# Patient Record
Sex: MALE | Race: WHITE | NOT HISPANIC OR LATINO | Employment: PART TIME | URBAN - METROPOLITAN AREA
[De-identification: names, ages, dates, MRNs, and addresses within clinical notes are randomized per-mention and may not be internally consistent; named-entity substitution may affect disease eponyms.]

---

## 2017-01-25 ENCOUNTER — OFFICE VISIT (OUTPATIENT)
Dept: FAMILY MEDICINE CLINIC | Facility: CLINIC | Age: 67
End: 2017-01-25

## 2017-01-25 VITALS
WEIGHT: 173 LBS | SYSTOLIC BLOOD PRESSURE: 106 MMHG | HEART RATE: 58 BPM | BODY MASS INDEX: 25.62 KG/M2 | DIASTOLIC BLOOD PRESSURE: 68 MMHG | TEMPERATURE: 98 F | RESPIRATION RATE: 16 BRPM | HEIGHT: 69 IN

## 2017-01-25 DIAGNOSIS — L98.9 ARM SKIN LESION, LEFT: Primary | ICD-10-CM

## 2017-01-25 PROCEDURE — 99212 OFFICE O/P EST SF 10 MIN: CPT | Performed by: FAMILY MEDICINE

## 2017-01-25 RX ORDER — MAG HYDROX/ALUMINUM HYD/SIMETH 400-400-40
1 SUSPENSION, ORAL (FINAL DOSE FORM) ORAL
COMMUNITY
End: 2019-02-28

## 2017-01-25 NOTE — PROGRESS NOTES
"Chief Complaint   Patient presents with   • Follow-up     left arm       Subjective   This patient returns the office to recheck left arm.  His previous skin lesion is totally resolved.  Review of Systems   Skin:        See hpi       Objective   Visit Vitals   • /68   • Pulse 58   • Temp 98 °F (36.7 °C) (Oral)   • Resp 16   • Ht 69\" (175.3 cm)   • Wt 173 lb (78.5 kg)   • BMI 25.55 kg/m2     Body mass index is 25.55 kg/(m^2).  Physical Exam   Skin:   Please see photo taken during office visit today that is seen below and also is filed under the media tab in the chart review section of patient's chart.           Assessment/Plan     Problem List Items Addressed This Visit        Musculoskeletal and Integument    RESOLVED: Arm skin lesion, left - Primary          Outpatient Encounter Prescriptions as of 1/25/2017   Medication Sig Dispense Refill   • aspirin 81 MG tablet Take 81 mg by mouth daily.     • GLUCOS-CHONDROIT-HYALURON-D3 PO Take 1 tablet by mouth.     • Omega-3 Fatty Acids (FISH OIL EXTRA STRENGTH) 1200 MG capsule Take 1 capsule by mouth.     • Saw Palmetto 450 MG capsule Take 1 capsule by mouth.       No facility-administered encounter medications on file as of 1/25/2017.        No orders of the defined types were placed in this encounter.      Continue with current treatment plan.         RTC as needed or sooner if symptoms worsen or change  "

## 2017-01-25 NOTE — MR AVS SNAPSHOT
"                        Jama Birch   1/25/2017 11:15 AM   Office Visit    Provider:  Jama Moses MD   Department:  [facility]   Dept Phone:  [phone]                Your Full Care Plan              Your Updated Medication List          This list is accurate as of: 1/25/17 11:52 AM.  Always use your most recent med list.                aspirin 81 MG tablet       FISH OIL EXTRA STRENGTH 1200 MG capsule       GLUCOS-CHONDROIT-HYALURON-D3 PO       Saw Palmetto 450 MG capsule               You Were Diagnosed With        Codes Comments    Arm skin lesion, left    -  Primary ICD-10-CM: L98.9  ICD-9-CM: 709.9       Instructions     None    Patient Instructions History      MyChart Signup     Our records indicate that you have an active [facility] [employer] account.    You can view your After Visit Summary by going to [employer] and logging in with your [employer] username and password.  If you don't have a [employer] username and password but a parent or guardian has access to your record, the parent or guardian should login with their own [employer] username and password and access your record to view the After Visit Summary.    If you have questions, you can email [email or web address] or call [phone] to talk to our [employer] staff.  Remember, [employer] is NOT to be used for urgent needs.  For medical emergencies, dial 911.               Other Info from Your Visit           Allergies     No Known Allergies      Reason for Visit     Follow-up left arm      Vital Signs     Blood Pressure Pulse Temperature Respirations Height Weight    106/68 58 98 °F (36.7 °C) (Oral) 16 69\" (175.3 cm) 173 lb (78.5 kg)    Body Mass Index Smoking Status                25.55 kg/m2 Never Smoker          No Longer an Issue     Arm skin lesion, left      "

## 2017-10-25 ENCOUNTER — OFFICE VISIT (OUTPATIENT)
Dept: FAMILY MEDICINE CLINIC | Facility: CLINIC | Age: 67
End: 2017-10-25

## 2017-10-25 VITALS
HEIGHT: 69 IN | TEMPERATURE: 98.1 F | WEIGHT: 176 LBS | SYSTOLIC BLOOD PRESSURE: 118 MMHG | DIASTOLIC BLOOD PRESSURE: 69 MMHG | RESPIRATION RATE: 16 BRPM | BODY MASS INDEX: 26.07 KG/M2 | HEART RATE: 58 BPM

## 2017-10-25 DIAGNOSIS — R35.1 NOCTURIA: ICD-10-CM

## 2017-10-25 DIAGNOSIS — Z00.00 ANNUAL PHYSICAL EXAM: Primary | ICD-10-CM

## 2017-10-25 DIAGNOSIS — Z11.59 NEED FOR HEPATITIS C SCREENING TEST: ICD-10-CM

## 2017-10-25 DIAGNOSIS — R93.89 ABNORMAL CT SCAN: ICD-10-CM

## 2017-10-25 DIAGNOSIS — D17.1 LIPOMA OF TORSO: ICD-10-CM

## 2017-10-25 PROCEDURE — 99397 PER PM REEVAL EST PAT 65+ YR: CPT | Performed by: FAMILY MEDICINE

## 2017-10-25 NOTE — PROGRESS NOTES
"Chief Complaint   Patient presents with   • Annual Exam       Subjective   This patient presents the office for well visit.  Overall he feels healthy.  Labs from urgent care center in March have been reviewed and are within normal limits.  He would like to get screening for hepatitis C.  He will send us information on his immunization status through his pharmacy.  We took several minutes and updated his medical records.  I have reviewed and updated his medications, medical history and problem list during today's office visit.        Social History   Substance Use Topics   • Smoking status: Never Smoker   • Smokeless tobacco: Never Used   • Alcohol use Yes      Comment: 3/month       Review of Systems   Constitutional: Negative for fatigue and unexpected weight change.   HENT: Negative.    Eyes: Negative.    Respiratory: Negative.    Cardiovascular: Negative.  Negative for chest pain.   Gastrointestinal: Negative.    Endocrine: Negative.    Genitourinary: Negative for difficulty urinating.   Musculoskeletal: Positive for arthralgias (stiffness).   Skin: Negative.    Allergic/Immunologic: Negative.    Neurological:        Vertigo 6 months ago   Hematological: Negative.    Psychiatric/Behavioral: Negative.        Objective   /69  Pulse 58  Temp 98.1 °F (36.7 °C) (Oral)   Resp 16  Ht 69\" (175.3 cm)  Wt 176 lb (79.8 kg)  BMI 25.99 kg/m2  Body mass index is 25.99 kg/(m^2).  Physical Exam   Constitutional: He is oriented to person, place, and time. Vital signs are normal. He appears well-developed and well-nourished. He is cooperative. No distress.   HENT:   Head: Normocephalic.   Right Ear: Hearing, tympanic membrane and external ear normal.   Left Ear: Hearing, tympanic membrane and external ear normal.   Nose: Nose normal.   Mouth/Throat: Uvula is midline, oropharynx is clear and moist and mucous membranes are normal.   Eyes: Conjunctivae, EOM and lids are normal. Pupils are equal, round, and reactive to " light.   Fundoscopic exam:       The right eye shows no AV nicking and no papilledema.        The left eye shows no AV nicking and no papilledema.   Neck: Trachea normal and normal range of motion. Neck supple. No JVD present. Carotid bruit is not present. No tracheal deviation present. No thyroid mass and no thyromegaly present.   Cardiovascular: Normal rate, regular rhythm, normal heart sounds and normal pulses.    No murmur heard.  Pulmonary/Chest: Effort normal and breath sounds normal.   Abdominal: Soft. Normal appearance and bowel sounds are normal. There is no hepatosplenomegaly. There is no tenderness.   Musculoskeletal: Normal range of motion.        Lumbar back: He exhibits no bony tenderness.   Lymphadenopathy:     He has no cervical adenopathy.        Right: No supraclavicular adenopathy present.        Left: No supraclavicular adenopathy present.   Neurological: He is alert and oriented to person, place, and time. He has normal strength. He is not disoriented. No cranial nerve deficit.   Reflex Scores:       Patellar reflexes are 2+ on the right side and 2+ on the left side.       Achilles reflexes are 2+ on the right side and 2+ on the left side.  Skin: Skin is warm and dry. No rash noted. No cyanosis. Nails show no clubbing.   Psychiatric: He has a normal mood and affect. His speech is normal and behavior is normal. Judgment and thought content normal. Cognition and memory are normal.   Vitals reviewed.      Mid back fatty lesion    Data Reviewed:        Assessment/Plan     Problem List Items Addressed This Visit        Genitourinary    Nocturia     Check PSA         Relevant Orders    PSA       Other    Lipoma of torso     Monitor yearly         Abnormal CT scan; splenic flexure thickening     He will check with GI to see if he needs additional CT follow up and then let me know           Other Visit Diagnoses     Annual physical exam    -  Primary    Need for hepatitis C screening test         Relevant Orders    Hepatitis C Antibody          Outpatient Encounter Prescriptions as of 10/25/2017   Medication Sig Dispense Refill   • aspirin 81 MG tablet Take 81 mg by mouth daily.     • Omega-3 Fatty Acids (FISH OIL EXTRA STRENGTH) 1200 MG capsule Take 1 capsule by mouth.     • Saw Palmetto 450 MG capsule Take 1 capsule by mouth.     • [DISCONTINUED] GLUCOS-CHONDROIT-HYALURON-D3 PO Take 1 tablet by mouth.       No facility-administered encounter medications on file as of 10/25/2017.        Orders Placed This Encounter   Procedures   • Hepatitis C Antibody   • PSA              F/U in one year

## 2017-10-26 LAB
HCV AB S/CO SERPL IA: <0.1 S/CO RATIO (ref 0–0.9)
PSA SERPL-MCNC: 0.63 NG/ML (ref 0–4)

## 2017-10-26 NOTE — PROGRESS NOTES
I have reviewed your recent test results and have found them to be within normal limits. Your prostate test is normal. There is no evidence of prior hepatis C infection.  Please continue with current recommendations as discussed at our last visit. Please keep follow up schedule with me as set up.  Please call if you have any questions                                                             Dr. Moses

## 2017-11-06 ENCOUNTER — APPOINTMENT (OUTPATIENT)
Dept: CARDIOLOGY | Facility: HOSPITAL | Age: 67
End: 2017-11-06

## 2017-11-06 ENCOUNTER — HOSPITAL ENCOUNTER (EMERGENCY)
Facility: HOSPITAL | Age: 67
Discharge: HOME | End: 2017-11-06
Attending: EMERGENCY MEDICINE | Admitting: EMERGENCY MEDICINE

## 2017-11-06 ENCOUNTER — APPOINTMENT (OUTPATIENT)
Dept: GENERAL RADIOLOGY | Facility: HOSPITAL | Age: 67
End: 2017-11-06

## 2017-11-06 VITALS
RESPIRATION RATE: 15 BRPM | WEIGHT: 175 LBS | OXYGEN SATURATION: 96 % | SYSTOLIC BLOOD PRESSURE: 147 MMHG | TEMPERATURE: 98.5 F | DIASTOLIC BLOOD PRESSURE: 77 MMHG | HEART RATE: 74 BPM | BODY MASS INDEX: 25.92 KG/M2 | HEIGHT: 69 IN

## 2017-11-06 DIAGNOSIS — M25.571 ACUTE RIGHT ANKLE PAIN: Primary | ICD-10-CM

## 2017-11-06 LAB
ALBUMIN SERPL-MCNC: 4.5 G/DL (ref 3.5–5.2)
ALBUMIN/GLOB SERPL: 1.5 G/DL
ALP SERPL-CCNC: 64 U/L (ref 39–117)
ALT SERPL W P-5'-P-CCNC: 20 U/L (ref 1–41)
ANION GAP SERPL CALCULATED.3IONS-SCNC: 11.7 MMOL/L
AST SERPL-CCNC: 26 U/L (ref 1–40)
BASOPHILS # BLD AUTO: 0.02 10*3/MM3 (ref 0–0.2)
BASOPHILS NFR BLD AUTO: 0.3 % (ref 0–1.5)
BILIRUB SERPL-MCNC: 0.7 MG/DL (ref 0.1–1.2)
BUN BLD-MCNC: 15 MG/DL (ref 8–23)
BUN/CREAT SERPL: 17 (ref 7–25)
CALCIUM SPEC-SCNC: 9.3 MG/DL (ref 8.6–10.5)
CHLORIDE SERPL-SCNC: 100 MMOL/L (ref 98–107)
CO2 SERPL-SCNC: 28.3 MMOL/L (ref 22–29)
CREAT BLD-MCNC: 0.88 MG/DL (ref 0.76–1.27)
DEPRECATED RDW RBC AUTO: 41.8 FL (ref 37–54)
EOSINOPHIL # BLD AUTO: 0.04 10*3/MM3 (ref 0–0.7)
EOSINOPHIL NFR BLD AUTO: 0.5 % (ref 0.3–6.2)
ERYTHROCYTE [DISTWIDTH] IN BLOOD BY AUTOMATED COUNT: 12.4 % (ref 11.5–14.5)
GFR SERPL CREATININE-BSD FRML MDRD: 87 ML/MIN/1.73
GLOBULIN UR ELPH-MCNC: 3.1 GM/DL
GLUCOSE BLD-MCNC: 83 MG/DL (ref 65–99)
HCT VFR BLD AUTO: 40.4 % (ref 40.4–52.2)
HGB BLD-MCNC: 14.1 G/DL (ref 13.7–17.6)
HOLD SPECIMEN: NORMAL
HOLD SPECIMEN: NORMAL
IMM GRANULOCYTES # BLD: 0.02 10*3/MM3 (ref 0–0.03)
IMM GRANULOCYTES NFR BLD: 0.3 % (ref 0–0.5)
LYMPHOCYTES # BLD AUTO: 1.76 10*3/MM3 (ref 0.9–4.8)
LYMPHOCYTES NFR BLD AUTO: 23 % (ref 19.6–45.3)
MCH RBC QN AUTO: 32.5 PG (ref 27–32.7)
MCHC RBC AUTO-ENTMCNC: 34.9 G/DL (ref 32.6–36.4)
MCV RBC AUTO: 93.1 FL (ref 79.8–96.2)
MONOCYTES # BLD AUTO: 0.89 10*3/MM3 (ref 0.2–1.2)
MONOCYTES NFR BLD AUTO: 11.6 % (ref 5–12)
NEUTROPHILS # BLD AUTO: 4.91 10*3/MM3 (ref 1.9–8.1)
NEUTROPHILS NFR BLD AUTO: 64.3 % (ref 42.7–76)
PLATELET # BLD AUTO: 185 10*3/MM3 (ref 140–500)
PMV BLD AUTO: 8.6 FL (ref 6–12)
POTASSIUM BLD-SCNC: 4.3 MMOL/L (ref 3.5–5.2)
PROT SERPL-MCNC: 7.6 G/DL (ref 6–8.5)
RBC # BLD AUTO: 4.34 10*6/MM3 (ref 4.6–6)
SODIUM BLD-SCNC: 140 MMOL/L (ref 136–145)
WBC NRBC COR # BLD: 7.64 10*3/MM3 (ref 4.5–10.7)
WHOLE BLOOD HOLD SPECIMEN: NORMAL
WHOLE BLOOD HOLD SPECIMEN: NORMAL

## 2017-11-06 PROCEDURE — 85025 COMPLETE CBC W/AUTO DIFF WBC: CPT | Performed by: EMERGENCY MEDICINE

## 2017-11-06 PROCEDURE — 80053 COMPREHEN METABOLIC PANEL: CPT | Performed by: EMERGENCY MEDICINE

## 2017-11-06 PROCEDURE — 73610 X-RAY EXAM OF ANKLE: CPT

## 2017-11-06 PROCEDURE — 93971 EXTREMITY STUDY: CPT

## 2017-11-06 PROCEDURE — 99283 EMERGENCY DEPT VISIT LOW MDM: CPT

## 2017-11-06 PROCEDURE — 36415 COLL VENOUS BLD VENIPUNCTURE: CPT | Performed by: EMERGENCY MEDICINE

## 2017-11-06 RX ORDER — HYDROCODONE BITARTRATE AND ACETAMINOPHEN 5; 325 MG/1; MG/1
1 TABLET ORAL EVERY 6 HOURS PRN
Qty: 20 TABLET | Refills: 0 | Status: SHIPPED | OUTPATIENT
Start: 2017-11-06 | End: 2019-02-28

## 2017-11-07 LAB
BH CV LOWER VASCULAR LEFT COMMON FEMORAL AUGMENT: NORMAL
BH CV LOWER VASCULAR LEFT COMMON FEMORAL COMPETENT: NORMAL
BH CV LOWER VASCULAR LEFT COMMON FEMORAL COMPRESS: NORMAL
BH CV LOWER VASCULAR LEFT COMMON FEMORAL PHASIC: NORMAL
BH CV LOWER VASCULAR LEFT COMMON FEMORAL SPONT: NORMAL
BH CV LOWER VASCULAR RIGHT COMMON FEMORAL AUGMENT: NORMAL
BH CV LOWER VASCULAR RIGHT COMMON FEMORAL COMPETENT: NORMAL
BH CV LOWER VASCULAR RIGHT COMMON FEMORAL COMPRESS: NORMAL
BH CV LOWER VASCULAR RIGHT COMMON FEMORAL PHASIC: NORMAL
BH CV LOWER VASCULAR RIGHT COMMON FEMORAL SPONT: NORMAL
BH CV LOWER VASCULAR RIGHT DISTAL FEMORAL COMPRESS: NORMAL
BH CV LOWER VASCULAR RIGHT GASTRONEMIUS COMPRESS: NORMAL
BH CV LOWER VASCULAR RIGHT GREATER SAPH AK COMPRESS: NORMAL
BH CV LOWER VASCULAR RIGHT GREATER SAPH BK COMPRESS: NORMAL
BH CV LOWER VASCULAR RIGHT LESSER SAPH COMPRESS: NORMAL
BH CV LOWER VASCULAR RIGHT MID FEMORAL AUGMENT: NORMAL
BH CV LOWER VASCULAR RIGHT MID FEMORAL COMPETENT: NORMAL
BH CV LOWER VASCULAR RIGHT MID FEMORAL COMPRESS: NORMAL
BH CV LOWER VASCULAR RIGHT MID FEMORAL PHASIC: NORMAL
BH CV LOWER VASCULAR RIGHT MID FEMORAL SPONT: NORMAL
BH CV LOWER VASCULAR RIGHT PERONEAL COMPRESS: NORMAL
BH CV LOWER VASCULAR RIGHT POPLITEAL AUGMENT: NORMAL
BH CV LOWER VASCULAR RIGHT POPLITEAL COMPETENT: NORMAL
BH CV LOWER VASCULAR RIGHT POPLITEAL COMPRESS: NORMAL
BH CV LOWER VASCULAR RIGHT POPLITEAL PHASIC: NORMAL
BH CV LOWER VASCULAR RIGHT POPLITEAL SPONT: NORMAL
BH CV LOWER VASCULAR RIGHT POSTERIOR TIBIAL COMPRESS: NORMAL
BH CV LOWER VASCULAR RIGHT PROXIMAL FEMORAL COMPRESS: NORMAL
BH CV LOWER VASCULAR RIGHT SAPHENOFEMORAL JUNCTION AUGMENT: NORMAL
BH CV LOWER VASCULAR RIGHT SAPHENOFEMORAL JUNCTION COMPETENT: NORMAL
BH CV LOWER VASCULAR RIGHT SAPHENOFEMORAL JUNCTION COMPRESS: NORMAL
BH CV LOWER VASCULAR RIGHT SAPHENOFEMORAL JUNCTION PHASIC: NORMAL
BH CV LOWER VASCULAR RIGHT SAPHENOFEMORAL JUNCTION SPONT: NORMAL

## 2017-11-07 NOTE — ED NOTES
Pt w increased right ankle swelling that started today without injury. Reports similar symptoms when twisted ankle 2 months ago.    Skin pwd, no extra warmth noted. CMS intact. Pedal pulses intact. Able to wt bear w only slight limp.    Pt here to r/o DVT to ankle.        Agatha Michelle RN  11/06/17 2019

## 2017-11-07 NOTE — PROGRESS NOTES
Vascular lab right lower extremity venous doppler complete, prelim negative DVT right leg - Dr Sam aware

## 2017-11-07 NOTE — ED PROVIDER NOTES
EMERGENCY DEPARTMENT ENCOUNTER    CHIEF COMPLAINT  Chief Complaint: right ankle swelling  History given by: patient  History limited by: nothing   Room Number: Room/bed info not found  PMD: Jama Moses MD      HPI:  Pt is a 67 y.o. male who presents complaining of atraumatic right ankle pain and swelling onset this morning. Pt reports that he had similar symptoms after twisting his ankle several months ago, but he has not injured it since then.     Onset: this morning  Timing: constant   Location: right ankle   Radiation: does not radiate   Quality: atraumatic  Intensity/Severity: moderate   Progression: unchanged   Associated Symptoms: pain and swelling   Aggravating Factors: none specified   Alleviating Factors: none specified   Previous Episodes: Pt had similar sx a few months ago after a sprain but has not injured it or had symptoms since   Treatment before arrival: none specified     PAST MEDICAL HISTORY  Active Ambulatory Problems     Diagnosis Date Noted   • Abnormal CT scan; splenic flexure thickening 2016   • Lipoma of torso 10/25/2017   • Nocturia 10/25/2017     Resolved Ambulatory Problems     Diagnosis Date Noted   • Arm skin lesion, left 2016   • Dizziness 2016     Past Medical History:   Diagnosis Date   • Kidney stone    • Prostate disorder        PAST SURGICAL HISTORY  History reviewed. No pertinent surgical history.    FAMILY HISTORY  Family History   Problem Relation Age of Onset   • Other Mother      brain tumor, meningioma   • Lung cancer Father 48     heavy smoker   • Diabetes Maternal Aunt    • Diabetes Maternal Uncle    • Hypertension Maternal Uncle    • Heart disease Paternal Grandfather    • Heart attack Maternal Grandmother    • Heart attack Paternal Grandmother 68        • Parkinsonism Maternal Uncle        SOCIAL HISTORY  Social History     Social History   • Marital status:      Spouse name: Tracey   • Number of children: 3   • Years of education: N/A      Occupational History   • [employer]           Social History Main Topics   • Smoking status: Never Smoker   • Smokeless tobacco: Never Used   • Alcohol use Yes      Comment: 3/month   • Drug use: No   • Sexual activity: Not on file     Other Topics Concern   • Not on file     Social History Narrative    Anniversary 1976     82 Coby, 85 Ernesto, 9/3/86 Lizbeth    5 Grandchildren       ALLERGIES  Review of patient's allergies indicates no known allergies.    REVIEW OF SYSTEMS  Review of Systems   Constitutional: Negative for fever.   Respiratory: Negative for shortness of breath.    Cardiovascular: Negative for chest pain.   Musculoskeletal:        Right ankle pain and swelling        PHYSICAL EXAM  ED Triage Vitals   Temp Heart Rate Resp BP SpO2   17   98.5 °F (36.9 °C) 87 16 142/85 97 %      Temp src Heart Rate Source Patient Position BP Location FiO2 (%)   -- -- -- -- --              Physical Exam   Constitutional: No distress.   HENT:   Head: Normocephalic and atraumatic.   Eyes: EOM are normal.   Neck: Normal range of motion.   Cardiovascular: Normal heart sounds.    Pulmonary/Chest: No respiratory distress.   Abdominal: There is no tenderness.   Musculoskeletal: He exhibits no edema.        Right ankle: He exhibits swelling. Tenderness. AITFL tenderness found. No head of 5th metatarsal tenderness found.   Neurological: He is alert.   Skin: Skin is warm and dry.   Nursing note and vitals reviewed.      LAB RESULTS  Lab Results (last 24 hours)     Procedure Component Value Units Date/Time    CBC & Differential [[id number]] Collected:  17    Specimen:  Blood Updated:  17    Narrative:       The following orders were created for panel order CBC & Differential.  Procedure                               Abnormality         Status                     ---------                                -----------         ------                     CBC Auto Differential[[id number]]        Abnormal            Final result                 Please view results for these tests on the individual orders.    Comprehensive Metabolic Panel [[id number]] Collected:  11/06/17 2016    Specimen:  Blood Updated:  11/06/17 2045     Glucose 83 mg/dL      BUN 15 mg/dL      Creatinine 0.88 mg/dL      Sodium 140 mmol/L      Potassium 4.3 mmol/L      Chloride 100 mmol/L      CO2 28.3 mmol/L      Calcium 9.3 mg/dL      Total Protein 7.6 g/dL      Albumin 4.50 g/dL      ALT (SGPT) 20 U/L      AST (SGOT) 26 U/L      Alkaline Phosphatase 64 U/L      Total Bilirubin 0.7 mg/dL      eGFR Non African Amer 87 mL/min/1.73      Globulin 3.1 gm/dL      A/G Ratio 1.5 g/dL      BUN/Creatinine Ratio 17.0     Anion Gap 11.7 mmol/L     CBC Auto Differential [[id number]]  (Abnormal) Collected:  11/06/17 2016    Specimen:  Blood Updated:  11/06/17 2025     WBC 7.64 10*3/mm3      RBC 4.34 (L) 10*6/mm3      Hemoglobin 14.1 g/dL      Hematocrit 40.4 %      MCV 93.1 fL      MCH 32.5 pg      MCHC 34.9 g/dL      RDW 12.4 %      RDW-SD 41.8 fl      MPV 8.6 fL      Platelets 185 10*3/mm3      Neutrophil % 64.3 %      Lymphocyte % 23.0 %      Monocyte % 11.6 %      Eosinophil % 0.5 %      Basophil % 0.3 %      Immature Grans % 0.3 %      Neutrophils, Absolute 4.91 10*3/mm3      Lymphocytes, Absolute 1.76 10*3/mm3      Monocytes, Absolute 0.89 10*3/mm3      Eosinophils, Absolute 0.04 10*3/mm3      Basophils, Absolute 0.02 10*3/mm3      Immature Grans, Absolute 0.02 10*3/mm3           I ordered the above labs and reviewed the results    RADIOLOGY  XR Ankle 3+ View Right   Preliminary Result   No acute fracture or dislocation. Moderately large ankle joint effusion,   indeterminate etiology and significance. Soft tissue swelling around the   ankle. Given no history of trauma, an infectious process/septic   arthritis cannot be excluded.            XR right ankle is  negative.     Preliminary doppler report: negative for DVT    I ordered the above noted radiological studies. Interpreted by radiologist. Reviewed by me in PACS.       PROCEDURES  Procedures  Splint Application:   Location: right ankle  Splint Type: walking boot  The splinted body part was neurovascularly unchanged and is in good alignment following the procedure.  Patient tolerated the procedure well with no immediate complications.      PROGRESS AND CONSULTS  ED Course     2010: Venous doppler RLE and labs were ordered in triage.     2149: Discussed negative US and plan to order XR to rule out pathologic lesion.     2225: XR was negative. Pt will be discharged with walking boot since pt seems to have sustained a ligamentous injury. I encouraged him to elevate/ice the area and f/u with orthopedist as needed. All questions were addressed    MEDICAL DECISION MAKING  Results were reviewed/discussed with the patient and they were also made aware of online access. Pt also made aware that some labs, such as cultures, will not be resulted during ER visit and follow up with PMD is necessary.     MDM  Number of Diagnoses or Management Options     Amount and/or Complexity of Data Reviewed  Clinical lab tests: reviewed  Tests in the radiology section of CPT®: ordered and reviewed    Patient Progress  Patient progress: stable         DIAGNOSIS  Final diagnoses:   Acute right ankle pain       DISPOSITION  DISCHARGE    Patient discharged in stable condition.    Reviewed implications of results, diagnosis, meds, responsibility to follow up, warning signs and symptoms of possible worsening, potential complications and reasons to return to ER.     Patient/Family voiced understanding of above instructions.    Discussed plan for discharge, as there is no emergent indication for admission.  Pt/family is agreeable and understands need for follow up and repeat testing.  Pt is aware that discharge does not mean that nothing is wrong but  it indicates no emergency is present that requires admission and they must continue care with follow-up as given below or physician of their choice.     FOLLOW-UP  [facility] OCCUPATIONAL [facility]ICINE [facility]   [address] [address]   [facility] [address] [phone]  Schedule an appointment as soon as possible for a visit           Medication List      New Prescriptions          HYDROcodone-acetaminophen 5-325 MG per tablet   Commonly known as:  NORCO   Take 1 tablet by mouth Every 6 (Six) Hours As Needed for Moderate Pain .           Latest Documented Vital Signs:  As of 10:26 PM  BP- 142/85 HR- 84 Temp- 98.5 °F (36.9 °C) O2 sat- 98%    --  Documentation assistance provided by genet Hoover for Jm Sam.  Information recorded by the scribe was done at my direction and has been verified and validated by me.           Subha Hoover  11/06/17 2226       Jm Sam MD  11/07/17 0027

## 2017-11-08 ENCOUNTER — TELEPHONE (OUTPATIENT)
Dept: SOCIAL WORK | Facility: HOSPITAL | Age: 67
End: 2017-11-08

## 2017-11-08 NOTE — TELEPHONE ENCOUNTER
ED f/u phone call: spoke w/ wife, as patient is at work. States that he is following d/c instructions and taking Ibuprofen for pain w/ relief. Reminded to make f/u katie't. No questions/concerns

## 2018-02-13 ENCOUNTER — TRANSCRIBE ORDERS (OUTPATIENT)
Dept: PHYSICAL THERAPY | Facility: CLINIC | Age: 68
End: 2018-02-13

## 2018-02-13 DIAGNOSIS — M72.2 PLANTAR FASCIITIS OF LEFT FOOT: Primary | ICD-10-CM

## 2018-02-20 ENCOUNTER — TREATMENT (OUTPATIENT)
Dept: PHYSICAL THERAPY | Facility: CLINIC | Age: 68
End: 2018-02-20

## 2018-02-20 DIAGNOSIS — M79.672 PAIN IN LEFT FOOT: Primary | ICD-10-CM

## 2018-02-20 PROCEDURE — 97110 THERAPEUTIC EXERCISES: CPT | Performed by: PHYSICAL THERAPIST

## 2018-02-20 PROCEDURE — 97035 APP MDLTY 1+ULTRASOUND EA 15: CPT | Performed by: PHYSICAL THERAPIST

## 2018-02-20 PROCEDURE — 97161 PT EVAL LOW COMPLEX 20 MIN: CPT | Performed by: PHYSICAL THERAPIST

## 2018-02-20 NOTE — PROGRESS NOTES
Physical Therapy Initial Evaluation and Plan of Care    Patient: Jama Birch   : 1950  Diagnosis/ICD-10 Code:  Pain in left foot [M79.672]  Referring practitioner: Dianna Concepcion MD  Past medical Hx reviewed: 2018     Subjective Evaluation    History of Present Illness  Date of onset: 2018  Mechanism of injury: His heel has felt like it had a bruise then 3 weeks ago he had shooting pain up his calf. He went to his doctor who gave him meloxicam.  The pain gets worse when on his feet for long times.  He has been rolling a lacrosse ball over his foot and using a night splint. He does't report any trouble sleeping. Before the night splint he had worse pain in the morning and would have to walk around on it to make it feel better.       Patient Occupation: employee at [employer]. Prolonged WB'ing required.     Precautions and Work Restrictions: noneQuality of life: good    Pain  Current pain ratin  At best pain ratin  At worst pain ratin  Quality: burning (nagging pain, burning in the back of his heel)  Aggravating factors: standing  Progression: improved    Social Support  Lives in: one-story house  Lives with: spouse    Hand dominance: right    Treatments  Treatments tried: using a foot spa with epson salt and warm water for about 30 minutes   Current treatment: medication  Current treatment comments: night splint.     Patient Goals  Patient goals for therapy: decreased pain and increased motion  Patient goal: wants to get rid of his pain so he doesn't have to walk with a limp.     Objective       Static Posture     Comments  Pts calcaneous slightly inverted bilaterally with limited motion.     Tenderness   Left Ankle/Foot   Tenderness in the plantar fascia.     Passive Range of Motion   Left Hip   Flexion: 128 degrees     Right Hip   Flexion: 125 degrees   Left Knee   Extension: 154 (Hamstring (90/90)) degrees     Right Knee   Extension: 145 (Hamstring (90/90)) degrees   Left  Ankle/Foot    Dorsiflexion (kf): 10 degrees   Plantar flexion: 45 degrees     Right Ankle/Foot    Dorsiflexion (kf): 20 degrees    Plantar flexion: 60 degrees     Strength/Myotome Testing     Left Hip   Planes of Motion   Flexion: 5  Extension: 5  Abduction: 5  Adduction: 5  External rotation: 5  Internal rotation: 5    Right Hip   Planes of Motion   Flexion: 5  Extension: 5  Abduction: 5  Adduction: 5  External rotation: 5  Internal rotation: 3+    Left Knee   Flexion: 5  Extension: 5    Right Knee   Flexion: 5  Extension: 5    Left Ankle/Foot   Dorsiflexion: 5  Plantar flexion: 5  Inversion: 5  Eversion: 5  Great toe flexion: 5  Great toe extension: 5    Right Ankle/Foot   Dorsiflexion: 5  Plantar flexion: 5  Inversion: 5  Eversion: 5  Great toe flexion: 5  Great toe extension: 5    Ambulation     Comments   When ambulating he presents with an antalgic gait pattern.  He has a decreased stance time on his left leg due to the pain in his left foot when ambulating.  He has increased pain when ambulating barefoot verses when ambulating with his shoes on his feet.       Assessment & Plan     Assessment  Impairments: abnormal gait, abnormal or restricted ROM, impaired physical strength and pain with function  Assessment details: Pt presents with ROM limitations especially with DF and PF in his LLE. He has tenderness over his  planter fascia in his L foot which has been causing him to walk with an antalgic gait pattern. He has difficulty and pain when performing functional ADLs and recreational activities.  Pt will benefit from skilled physical therapy services to address is limited ROM, pain and gait pattern.   Prognosis: good  Prognosis details:     Functional Limitations: walking, uncomfortable because of pain and standing  Goals  Plan Goals:   Short term goals:  4 weeks  1.Pt to be instructed in initial HEP.  2. Minimal palpable tenderness to his left planter fascia to improve his gait pattern and decrease his pain.    3. Pt will increase his ROM (Hamstring 90/90 bilaterally to 160 degrees) to improve his ability to perform functional ADLs    Long term goals:  8 weeks  1. Pt will report < 3/10 at worst to complete functional activities.   2. Pt will increase his ROM (DF to 15, PF to 45 ) to normalize his gait pattern and improve his ability to perform functional ADLs  3. Pt will report <2/10 pain after completion of one work day to improve his ability to perform functional ADLs.   4. Pt will score an LEFS > 73/80  (% perceived normal ability)  5. Pt will report no palapable tenderness to his left planter fascia to improve gait and functional ADLs  6. Pt will ambulate for 2 minutes with an obeserved normal gait pattern (stance phase time similar bilaterally) to improve gait pattern.     Plan  Therapy options: will be seen for skilled physical therapy services  Planned modality interventions: ultrasound, TENS, cryotherapy and iontophoresis  Other planned modality interventions: Dry needling PRN   Planned therapy interventions: joint mobilization, home exercise program, gait training, flexibility, balance/weight-bearing training, manual therapy, neuromuscular re-education, soft tissue mobilization, strengthening, stretching and therapeutic activities  Frequency: 2x week  Duration in weeks: 8  Treatment plan discussed with: patient  Plan details: Pt will benefit from skilled physical therapy including education and implementation of an HEP, strengthening to his hips and improving ROM especially in his left ankle.  He will also benefit from manual therapy and modalities such as ultrasound.         Manual Therapy:    4     mins  50033; (Heel taping)   Therapeutic Exercise:    12     mins  02878;     Neuromuscular Percy:    -    mins  34065;    Therapeutic Activity:     -     mins  88245;     Gait Training:      -     mins  85888;     Ultrasound:     8     mins  46011;    Electrical Stimulation:    -     mins  07512 ( );  Dry  Needling     -     mins self-pay    Timed Treatment:   24   mins   Total Treatment:     60   mins    I was present in the PT Department guiding the student by approving, concurring, and confirming the skilled judgement for all services rendered.     PT SIGNATURE: ABDULLAHI Carrasco License #: [id number]    DATE TREATMENT INITIATED: 2/21/2018    Initial Certification  Certification Period: 5/22/2018  I certify that the therapy services are furnished while this patient is under my care.  The services outlined above are required by this patient, and will be reviewed every 90 days.     PHYSICIAN: Dianna Concepcion MD      DATE:     Please sign and return via fax to [phone].. Thank you, [facility].

## 2018-02-26 ENCOUNTER — TREATMENT (OUTPATIENT)
Dept: PHYSICAL THERAPY | Facility: CLINIC | Age: 68
End: 2018-02-26

## 2018-02-26 DIAGNOSIS — M79.672 PAIN IN LEFT FOOT: Primary | ICD-10-CM

## 2018-02-26 PROCEDURE — 97035 APP MDLTY 1+ULTRASOUND EA 15: CPT | Performed by: PHYSICAL THERAPIST

## 2018-02-26 PROCEDURE — 97110 THERAPEUTIC EXERCISES: CPT | Performed by: PHYSICAL THERAPIST

## 2018-02-26 NOTE — PROGRESS NOTES
Physical Therapy Daily Progress Note  Visits:2    Subjective : Jama Birch reports: his foot is feeling better.  He states he was on vacation last week so he wasn't on his feet as much as he would be during a normal work week.  His night splints were causing some numbness so he loosened them. He reports the heel taping felt great and just fell off yesterday.     Objective   Gait Observation: Decreased limping noted during ambulation.     See Exercise, Manual, and Modality Logs for complete treatment.     Assessment/Plan: A great toe stretch and toe intrinsics with a towel were added to his HEP.  He had difficulty completing the toe intrinsic exercise with the marbles but was properly activating his muscles and will continue to progress during following treatment sessions.        Progress per Plan of Care         Manual Therapy:    5     mins  38700; Leukotaping to L heel   Therapeutic Exercise:    32     mins  50493;     Neuromuscular Percy:    -    mins  61172;    Therapeutic Activity:     -     mins  18837;     Gait Training:      -     mins  19406;     Ultrasound:    8   mins  58551;    Electrical Stimulation:    -     mins  20764 ( );  Dry Needling     -     mins self-pay    Timed Treatment:   45   mins   Total Treatment:     60   mins    I was present in the PT Department guiding the student by approving, concurring, and confirming the skilled judgement for all services rendered.     David Rowe PT  [place] License #[id number]    Physical Therapist

## 2018-03-02 ENCOUNTER — TREATMENT (OUTPATIENT)
Dept: PHYSICAL THERAPY | Facility: CLINIC | Age: 68
End: 2018-03-02

## 2018-03-02 DIAGNOSIS — M79.672 PAIN IN LEFT FOOT: Primary | ICD-10-CM

## 2018-03-02 PROCEDURE — 97140 MANUAL THERAPY 1/> REGIONS: CPT | Performed by: PHYSICAL THERAPIST

## 2018-03-02 PROCEDURE — 97110 THERAPEUTIC EXERCISES: CPT | Performed by: PHYSICAL THERAPIST

## 2018-03-02 NOTE — PROGRESS NOTES
Physical Therapy Daily Progress Note  Visits:3    Subjective : Jama Birch reports: he is feeling much better and is just having minimal pain at his heel.  He states his heel burns after being on it for a long period of time. Otherwise he reports the pain in the arch of his foot feels like it is getting better every day.     Objective Tenderness upon palpation noted at medial portion of his L heel.      See Exercise, Manual, and Modality Logs for complete treatment.     Assessment/Plan: Due to fair positioning with the 90/90 hamstring stretch he was given a standing hamstring stretch and a long sitting hamstring stretch.  In long sitting he has one leg off the table flat on the floor and DF the foot that is being stretched.  Performing these stretches separately allowed for better form. He is improving his strength in his toe intrinsics with the towel stretch and will attempt to perform with the marbles during the next treatment session.     Progress per Plan of Care and Progress strengthening /stabilization /functional activity       Manual Therapy:    15     mins  53943; (leukotape (L) heel for 5 min)   Therapeutic Exercise:    30     mins  20413;     Neuromuscular Percy:    -    mins  00267;    Therapeutic Activity:     -     mins  37294;     Gait Training:      -     mins  59694;     Ultrasound:     -     mins  56071;    Electrical Stimulation:    -     mins  02234 ( );  Dry Needling     -     mins self-pay    Timed Treatment:   45   mins   Total Treatment:     60   mins    I was present in the PT Department guiding the student by approving, concurring, and confirming the skilled judgement for all services rendered.     David Rowe PT  [place] License #[id number]    Physical Therapist

## 2018-03-05 ENCOUNTER — TREATMENT (OUTPATIENT)
Dept: PHYSICAL THERAPY | Facility: CLINIC | Age: 68
End: 2018-03-05

## 2018-03-05 DIAGNOSIS — M79.672 PAIN IN LEFT FOOT: Primary | ICD-10-CM

## 2018-03-05 PROCEDURE — 97110 THERAPEUTIC EXERCISES: CPT | Performed by: PHYSICAL THERAPIST

## 2018-03-05 PROCEDURE — 97140 MANUAL THERAPY 1/> REGIONS: CPT | Performed by: PHYSICAL THERAPIST

## 2018-03-05 NOTE — PROGRESS NOTES
Physical Therapy Daily Progress Note  Visits:4    Subjective : Jama Birch reports: that his heel pain was much better over the weekend.  He states he was able to complete a full work day with no pain. The therabar has been working great and he also reports the scraping last visit felt good.    Objective   See Exercise, Manual, and Modality Logs for complete treatment.     Assessment/Plan: Manual therapy to his talocrural jt to assist with DF and PF was applied during today's treatment session. He tolerated the manual therapy well, reporting no discomfort.  He was re-educated on his HEP and given cues for the stretching activities.  During next treatment session more manual therapy will be performed and Leukotape will be applied to his heel.     Progress per Plan of Care and Progress strengthening /stabilization /functional activity         Manual Therapy:    16     mins  91813;  Therapeutic Exercise:    25     mins  53531;     Neuromuscular Percy:    -    mins  70205;    Therapeutic Activity:     -     mins  46166;     Gait Training:      -     mins  23405;     Ultrasound:     -     mins  45192;    Electrical Stimulation:    -     mins  58701 ( );  Dry Needling     -     mins self-pay    Timed Treatment:   41   mins   Total Treatment:     60   mins    I was present in the PT Department guiding the student by approving, concurring, and confirming the skilled judgement for all services rendered.     David Rowe PT  [place] License #[id number]    Physical Therapist

## 2018-03-09 ENCOUNTER — TREATMENT (OUTPATIENT)
Dept: PHYSICAL THERAPY | Facility: CLINIC | Age: 68
End: 2018-03-09

## 2018-03-09 DIAGNOSIS — M79.672 PAIN IN LEFT FOOT: Primary | ICD-10-CM

## 2018-03-09 PROCEDURE — 97110 THERAPEUTIC EXERCISES: CPT | Performed by: PHYSICAL THERAPIST

## 2018-03-09 PROCEDURE — 97140 MANUAL THERAPY 1/> REGIONS: CPT | Performed by: PHYSICAL THERAPIST

## 2018-03-09 NOTE — PROGRESS NOTES
Physical Therapy Discharge Note  Visits:5    Subjective : Jama Birch reports: he is feeling so much better.  He doesn't have any pain related to his planter fascia.  He does have very dry skin on his feet and states that he has been putting lotions and ointments on at night and in the morning but that it hurts to walk from the dry skin.     Pain: 0/10    Objective Very dry skin at both feet with cracks in his heels.      Passive Range of Motion   Left Hip   Flexion: 135 degrees      Right Hip   Flexion: 134 degrees     Left Knee   Extension: 170 (Hamstring (90/90)) degrees      Right Knee   Extension: 155 (Hamstring (90/90)) degrees     Left Ankle/Foot  Dorsiflexion (kf): 10 degrees (26 degrees in a standing test position)   Plantar flexion: 60 degrees      Right Ankle/Foot  Dorsiflexion (kf): 20 degrees    Plantar flexion: 60 degrees      Strength/Myotome Testing      Left Hip   Planes of Motion   Flexion: 5  Extension: 5  Abduction: 5  Adduction: 5  External rotation: 5  Internal rotation: 5     Right Hip   Planes of Motion   Flexion: 5  Extension: 5  Abduction: 5  Adduction: 5  External rotation: 5  Internal rotation: 5     Left Knee   Flexion: 5  Extension: 5     Right Knee   Flexion: 5  Extension: 5     Left Ankle/Foot   Dorsiflexion: 5  Plantar flexion: 5  Inversion: 5  Eversion: 5  Great toe flexion: 5  Great toe extension: 5     Right Ankle/Foot   Dorsiflexion: 5  Plantar flexion: 5  Inversion: 5  Eversion: 5  Great toe flexion: 5  Great toe extension: 5     Ambulation      See Exercise, Manual, and Modality Logs for complete treatment.     Assessment & Plan     Assessment  Assessment details: Pt met 3/4 short term goals and 5/6 long term goals, partially meeting his L hamstring and DF ROM goals but still showing improvement.  His pain is now reported at a 0/10 and he has no tenderness with palpation. He has overall shown great improvement with his ROM.      Goals  Plan Goals: Short term goals:  4  weeks  1.Pt to be instructed in initial HEP. (MET)   2. Minimal palpable tenderness to his left planter fascia to improve his gait pattern and decrease his pain. (MET)  3. Pt will increase his ROM (Hamstring 90/90 bilaterally to 160 degrees) to improve his ability to perform functional ADLs  (partially met; R 170, L 155)    Long term goals:  8 weeks  1. Pt will report < 3/10 at worst to complete functional activities. (MET)  2. Pt will increase his ROM (DF to 15, PF to 45 ) to normalize his gait pattern and improve his ability to perform functional ADLs (partially met; DF 10, PF 60)  3. Pt will report <2/10 pain after completion of one work day to improve his ability to perform functional ADLs. (MET)  4. Pt will score an LEFS > 73/80  (% perceived normal ability)  5. Pt will report no palapable tenderness to his left planter fascia to improve gait and functional ADLs (MET; 80)  6. Pt will ambulate for 2 minutes with an obeserved normal gait pattern (stance phase time similar bilaterally) to improve gait pattern. (MET)     Plan  Therapy options: will not be seen for skilled physical therapy services  Treatment plan discussed with: patient  Plan details: He will be discharged home with an HEP.      Other Discharge       Manual Therapy:    12     mins  54454;  Therapeutic Exercise:    32     mins  69947;     Neuromuscular Percy:    -    mins  81125;    Therapeutic Activity:     -     mins  41656;     Gait Training:      -     mins  07089;     Ultrasound:     -     mins  80549;    Electrical Stimulation:    -     mins  22517 ( );  Dry Needling     -     mins self-pay    Timed Treatment:   44   mins   Total Treatment:     65   mins    I was present in the PT Department guiding the student by approving, concurring, and confirming the skilled judgement for all services rendered.     David Rowe PT  [place] License #[id number]    Physical Therapist

## 2019-02-28 ENCOUNTER — OFFICE VISIT (OUTPATIENT)
Dept: FAMILY MEDICINE CLINIC | Facility: CLINIC | Age: 69
End: 2019-02-28

## 2019-02-28 VITALS
WEIGHT: 177 LBS | RESPIRATION RATE: 16 BRPM | SYSTOLIC BLOOD PRESSURE: 123 MMHG | BODY MASS INDEX: 26.22 KG/M2 | DIASTOLIC BLOOD PRESSURE: 75 MMHG | TEMPERATURE: 97.8 F | HEART RATE: 58 BPM | HEIGHT: 69 IN

## 2019-02-28 DIAGNOSIS — Z13.6 SCREENING FOR ISCHEMIC HEART DISEASE: ICD-10-CM

## 2019-02-28 DIAGNOSIS — Z23 NEED FOR PNEUMOCOCCAL VACCINE: ICD-10-CM

## 2019-02-28 DIAGNOSIS — Z00.00 MEDICARE WELCOME EXAM: Primary | ICD-10-CM

## 2019-02-28 PROCEDURE — G0402 INITIAL PREVENTIVE EXAM: HCPCS | Performed by: FAMILY MEDICINE

## 2019-02-28 PROCEDURE — G0009 ADMIN PNEUMOCOCCAL VACCINE: HCPCS | Performed by: FAMILY MEDICINE

## 2019-02-28 PROCEDURE — G0403 EKG FOR INITIAL PREVENT EXAM: HCPCS | Performed by: FAMILY MEDICINE

## 2019-02-28 PROCEDURE — 90670 PCV13 VACCINE IM: CPT | Performed by: FAMILY MEDICINE

## 2019-02-28 NOTE — PATIENT INSTRUCTIONS
Check on insurance coverage and cost for Shingrix (newest shingles vaccine) and get the immunization at your local pharmacy. It is more effective than the old Zostavax vaccine and is recommended even if you have had the Zostavax vaccine in the past. For more information, please look at the website below:    Https://www.cdc.gov/vaccines/vpd/shingles/public/shingrix/index.html    Annual flu shot has been recommended to patient. Optimal timing of this vaccination is in mid October of each year.         Medicare Wellness  Personal Prevention Plan of Service     Date of Office Visit:  2019  Encounter Provider:  Jama Moses MD  Place of Service:  [facility]  Patient Name: Jama Birch  :  1950    As part of the Medicare Wellness portion of your visit today, we are providing you with this personalized preventive plan of services (PPPS). This plan is based upon recommendations of the [place] Preventive Services Task Force (USPSTF) and the Advisory Committee on Immunization Practices (ACIP).    This lists the preventive care services that should be considered, and provides dates of when you are due. Items listed as completed are up-to-date and do not require any further intervention.    Health Maintenance   Topic Date Due   • ZOSTER VACCINE (2 of 2) 2015   • PNEUMOCOCCAL VACCINES (65+ LOW/MEDIUM RISK) (1 of 2 - PCV13) 2016   • ANNUAL PHYSICAL  10/26/2018   • TDAP/TD VACCINES (2 - Td) 2026   • COLONOSCOPY  2027   • HEPATITIS C SCREENING  Completed   • INFLUENZA VACCINE  Completed       Orders Placed This Encounter   Procedures   • Pneumococcal Conjugate Vaccine 13-Valent All (PCV13)   • ECG 12 Lead     This order was created via procedure documentation       No Follow-up on file.              Exercising to Stay Healthy  Exercising regularly is important. It has many health benefits, such as:  · Improving your overall fitness, flexibility, and  endurance.  · Increasing your bone density.  · Helping with weight control.  · Decreasing your body fat.  · Increasing your muscle strength.  · Reducing stress and tension.  · Improving your overall health.    In order to become healthy and stay healthy, it is recommended that you do moderate-intensity and vigorous-intensity exercise. You can tell that you are exercising at a moderate intensity if you have a higher heart rate and faster breathing, but you are still able to hold a conversation. You can tell that you are exercising at a vigorous intensity if you are breathing much harder and faster and cannot hold a conversation while exercising.  How often should I exercise?  Choose an activity that you enjoy and set realistic goals. Your health care provider can help you to make an activity plan that works for you. Exercise regularly as directed by your health care provider. This may include:  · Doing resistance training twice each week, such as:  ? Push-ups.  ? Sit-ups.  ? Lifting weights.  ? Using resistance bands.  · Doing a given intensity of exercise for a given amount of time. Choose from these options:  ? 150 minutes of moderate-intensity exercise every week.  ? 75 minutes of vigorous-intensity exercise every week.  ? A mix of moderate-intensity and vigorous-intensity exercise every week.    Children, pregnant women, people who are out of shape, people who are overweight, and older adults may need to consult a health care provider for individual recommendations. If you have any sort of medical condition, be sure to consult your health care provider before starting a new exercise program.  What are some exercise ideas?  Some moderate-intensity exercise ideas include:  · Walking at a rate of 1 mile in 15 minutes.  · Biking.  · Hiking.  · Golfing.  · Dancing.    Some vigorous-intensity exercise ideas include:  · Walking at a rate of at least 4.5 miles per hour.  · Jogging or running at a rate of 5 miles per  hour.  · Biking at a rate of at least 10 miles per hour.  · Lap swimming.  · Roller-skating or in-line skating.  · Cross-country skiing.  · Vigorous competitive sports, such as football, basketball, and soccer.  · Jumping rope.  · Aerobic dancing.    What are some everyday activities that can help me to get exercise?  · Yard work, such as:  ? Pushing a .  ? Raking and bagging leaves.  · Washing and waxing your car.  · Pushing a stroller.  · Shoveling snow.  · Gardening.  · Washing windows or floors.  How can I be more active in my day-to-day activities?  · Use the stairs instead of the elevator.  · Take a walk during your lunch break.  · If you drive, park your car farther away from work or school.  · If you take public transportation, get off one stop early and walk the rest of the way.  · Make all of your phone calls while standing up and walking around.  · Get up, stretch, and walk around every 30 minutes throughout the day.  What guidelines should I follow while exercising?  · Do not exercise so much that you hurt yourself, feel dizzy, or get very short of breath.  · Consult your health care provider before starting a new exercise program.  · Wear comfortable clothes and shoes with good support.  · Drink plenty of water while you exercise to prevent dehydration or heat stroke. Body water is lost during exercise and must be replaced.  · Work out until you breathe faster and your heart beats faster.  This information is not intended to replace advice given to you by your health care provider. Make sure you discuss any questions you have with your health care provider.  Document Released: 01/20/2012 Document Revised: 05/25/2017 Document Reviewed: 05/21/2015  [employer] Interactive Patient Education © 2018 [employer]  Serving Sizes  A serving size is a measured amount of food or drink, such as one slice of bread, that has an associated nutrient content. Knowing the serving size of a food or drink can help  you determine how much of that food you should consume.  What is the size of one serving?  The size of one healthy serving depends on the food or drink. To determine a serving size, read the food label. If the food or drink does not have a food label, try to find serving size information online. Or, use the following to estimate the size of one adult serving:  Grain  1 slice bread. ½ bagel. ½ cup pasta.  Vegetable  ½ cup cooked or canned vegetables. 1 cup raw, leafy greens.  Fruit  ½ cup canned fruit. 1 medium fruit. ¼ cup dried fruit.  Meat and Other Protein Sources  1 oz meat, poultry, or fish. ¼ cup cooked beans. 1 egg. ¼ cup nuts or seeds. 1 Tbsp nut butter. ¼ cup tofu or tempeh. 2 Tbsp hummus.  Dairy  An individual container of yogurt (6-8 oz). 1 piece of cheese the size of your thumb (1 oz). 1 cup (8 oz) milk or milk alternative.  Fat  A piece the size of one dice. 1 tsp soft margarine. 1 Tbsp mayonnaise. 1 tsp vegetable oil. 1 Tbsp regular salad dressing. 2 Tbsp low-fat salad dressing.  How many servings should I eat from each food group each day?  The following are the suggested number of servings to try and have every day from each food group. You can also look at your eating throughout the week and aim for meeting these requirements on most days for overall healthy eating.  Grain  6-8 servings. Try to have half of your grains from whole grains, such as whole wheat bread, corn tortillas, oatmeal, brown rice, whole wheat pasta, and bulgur.  Vegetable  At least 2½-3 servings.  Fruit  2 servings.  Meat and Other Protein Foods  5-6 servings. Aim to have lean proteins, such as chicken, turkey, fish, beans, or tofu.  Dairy  3 servings. Choose low-fat or nonfat if you are trying to control your weight.  Fat  2-3 servings.  Is a serving the same thing as a portion?  No. A portion is the actual amount you eat, which may be more than one serving. Knowing the specific serving size of a food and the nutritional  information that goes with it can help you make a healthy decision on what size portion to eat.  What are some tips to help me learn healthy serving sizes?  · Check food labels for serving sizes. Many foods that come as a single portion actually contain multiple servings.  · Determine the serving size of foods you commonly eat and figure out how large a portion you usually eat.  · Measure the number of servings that can be held by the bowls, glasses, cups, and plates you typically use. For example, pour your breakfast cereal into your regular bowl and then pour it into a measuring cup.  · For 1-2 days, measure the serving sizes of all the foods you eat.  · Practice estimating serving sizes and determining how big your portions should be.  This information is not intended to replace advice given to you by your health care provider. Make sure you discuss any questions you have with your health care provider.  Document Released: 09/15/2004 Document Revised: 08/12/2017 Document Reviewed: 03/17/2015  [employer] Interactive Patient Education © 2018 [employer]        Fall Prevention in the Home  Falls can cause injuries and can affect people from all age groups. There are many simple things that you can do to make your home safe and to help prevent falls.  What can I do on the outside of my home?  · Regularly repair the edges of walkways and driveways and fix any cracks.  · Remove high doorway thresholds.  · Trim any shrubbery on the main path into your home.  · Use bright outdoor lighting.  · Clear walkways of debris and clutter, including tools and rocks.  · Regularly check that handrails are securely fastened and in good repair. Both sides of any steps should have handrails.  · Install guardrails along the edges of any raised decks or porches.  · Have leaves, snow, and ice cleared regularly.  · Use sand or salt on walkways during winter months.  · In the garage, clean up any spills right away, including grease or oil  spills.  What can I do in the bathroom?  · Use night lights.  · Install grab bars by the toilet and in the tub and shower. Do not use towel bars as grab bars.  · Use non-skid mats or decals on the floor of the tub or shower.  · If you need to sit down while you are in the shower, use a plastic, non-slip stool.  · Keep the floor dry. Immediately clean up any water that spills on the floor.  · Remove soap buildup in the tub or shower on a regular basis.  · Attach bath mats securely with double-sided non-slip rug tape.  · Remove throw rugs and other tripping hazards from the floor.  What can I do in the bedroom?  · Use night lights.  · Make sure that a bedside light is easy to reach.  · Do not use oversized bedding that drapes onto the floor.  · Have a firm chair that has side arms to use for getting dressed.  · Remove throw rugs and other tripping hazards from the floor.  What can I do in the kitchen?  · Clean up any spills right away.  · Avoid walking on wet floors.  · Place frequently used items in easy-to-reach places.  · If you need to reach for something above you, use a sturdy step stool that has a grab bar.  · Keep electrical cables out of the way.  · Do not use floor polish or wax that makes floors slippery. If you have to use wax, make sure that it is non-skid floor wax.  · Remove throw rugs and other tripping hazards from the floor.  What can I do in the stairways?  · Do not leave any items on the stairs.  · Make sure that there are handrails on both sides of the stairs. Fix handrails that are broken or loose. Make sure that handrails are as long as the stairways.  · Check any carpeting to make sure that it is firmly attached to the stairs. Fix any carpet that is loose or worn.  · Avoid having throw rugs at the top or bottom of stairways, or secure the rugs with carpet tape to prevent them from moving.  · Make sure that you have a light switch at the top of the stairs and the bottom of the stairs. If you do  not have them, have them installed.  What are some other fall prevention tips?  · Wear closed-toe shoes that fit well and support your feet. Wear shoes that have rubber soles or low heels.  · When you use a stepladder, make sure that it is completely opened and that the sides are firmly locked. Have someone hold the ladder while you are using it. Do not climb a closed stepladder.  · Add color or contrast paint or tape to grab bars and handrails in your home. Place contrasting color strips on the first and last steps.  · Use mobility aids as needed, such as canes, walkers, scooters, and crutches.  · Turn on lights if it is dark. Replace any light bulbs that burn out.  · Set up furniture so that there are clear paths. Keep the furniture in the same spot.  · Fix any uneven floor surfaces.  · Choose a carpet design that does not hide the edge of steps of a stairway.  · Be aware of any and all pets.  · Review your medicines with your healthcare provider. Some medicines can cause dizziness or changes in blood pressure, which increase your risk of falling.  Talk with your health care provider about other ways that you can decrease your risk of falls. This may include working with a physical therapist or  to improve your strength, balance, and endurance.  This information is not intended to replace advice given to you by your health care provider. Make sure you discuss any questions you have with your health care provider.  Document Released: 12/08/2003 Document Revised: 05/16/2017 Document Reviewed: 01/22/2016  [employer] Interactive Patient Education © 2018 [employer]      Sit-to-Stand Exercise  The sit-to-stand exercise (also known as the chair stand or chair rise exercise) strengthens your lower body and helps you maintain or improve your mobility and independence. The goal is to do the sit-to-stand exercise without using your hands. This will be easier as you become stronger. You should always talk with your  health care provider before starting any exercise program, especially if you have had recent surgery.  Do the exercise exactly as told by your health care provider and adjust it as directed. It is normal to feel mild stretching, pulling, tightness, or discomfort as you do this exercise, but you should stop right away if you feel sudden pain or your pain gets worse. Do not begin doing this exercise until told by your health care provider.  What the sit-to-stand exercise does  The sit-to-stand exercise helps to strengthen the muscles in your thighs and the muscles in the center of your body that give you stability (core muscles). This exercise is especially helpful if:  · You have had knee or hip surgery.  · You have trouble getting up from a chair, out of a car, or off the toilet.    How to do the sit-to-stand exercise  1. Sit toward the front edge of a sturdy chair without armrests. Your knees should be bent and your feet should be flat on the floor and shoulder-width apart.  2. Place your hands lightly on each side of the seat. Keep your back and neck as straight as possible, with your chest slightly forward.  3. Breathe in slowly. Lean forward and slightly shift your weight to the front of your feet.  4. Breathe out as you slowly stand up. Use your hands as little as possible.  5. Stand and pause for a full breath in and out.  6. Breathe in as you sit down slowly. Tighten your core and abdominal muscles to control your lowering as much as possible.  7. Breathe out slowly.  8. Do this exercise 10-15 times. If needed, do it fewer times until you build up strength.  9. Rest for 1 minute, then do another set of 10-15 repetitions.  To change the difficulty of the sit-to-stand exercise  · If the exercise is too difficult, use a chair with sturdy armrests, and push off the armrests to help you come to the standing position. You can also use the armrests to help slowly lower yourself back to sitting. As this gets easier,  try to use your arms less. You can also place a firm cushion or pillow on the chair to make the surface higher.  · If this exercise is too easy, do not use your arms to help raise or lower yourself. You can also wear a weighted vest, use hand weights, increase your repetitions, or try a lower chair.  General tips  · You may feel tired when starting an exercise routine. This is normal.  · You may have muscle soreness that lasts a few days. This is normal. As you get stronger, you may not feel muscle soreness.  · Use smooth, steady movements.  · Do not  hold your breath during strength exercises. This can cause unsafe changes in your blood pressure.  · Breathe in slowly through your nose, and breathe out slowly through your mouth.  Summary  · Strengthening your lower body is an important step to help you move safely and independently.  · The sit-to-stand exercise helps strengthen the muscles in your thighs and core.  · You should always talk with your health care provider before starting any exercise program, especially if you have had recent surgery.  This information is not intended to replace advice given to you by your health care provider. Make sure you discuss any questions you have with your health care provider.  Document Released: 02/08/2018 Document Revised: 02/08/2018 Document Reviewed: 02/08/2018  [employer] Interactive Patient Education © 2018 [employer]

## 2019-02-28 NOTE — PROGRESS NOTES
QUICK REFERENCE INFORMATION:  The ABCs of the Annual Wellness Visit    Welcome to Medicare Visit    HEALTH RISK ASSESSMENT    1950    Recent Hospitalizations:  No hospitalization(s) within the last year..      Current Medical Providers:  Patient Care Team:  Jama Moses MD as PCP - General (Family Medicine)  Alfie Hooks MD as Consulting Physician (General Surgery)  Neri Lin MD as Consulting Physician (Ophthalmology)      Smoking Status:  Social History     Tobacco Use   Smoking Status Never Smoker   Smokeless Tobacco Never Used       Alcohol Consumption:  Social History     Substance and Sexual Activity   Alcohol Use Yes   • Alcohol/week: 0.6 oz   • Types: 1 Shots of liquor per week   • Frequency: Monthly or less   • Drinks per session: 1 or 2   • Binge frequency: Never    Comment: 3/month       Depression Screen:   PHQ-2/PHQ-9 Depression Screening 2/28/2019   Little interest or pleasure in doing things 0   Feeling down, depressed, or hopeless 0   Total Score 0       Health Habits and Functional and Cognitive Screening:  Functional & Cognitive Status 2/28/2019   Do you have difficulty preparing food and eating? No   Do you have difficulty bathing yourself, getting dressed or grooming yourself? No   Do you have difficulty using the toilet? No   Do you have difficulty moving around from place to place? No   Do you have trouble with steps or getting out of a bed or a chair? No   In the past year have you fallen or experienced a near fall? No   Current Diet Well Balanced Diet   Dental Exam Up to date   Eye Exam Up to date   Exercise (times per week) 2 times per week   Current Exercise Activities Include Walking   Do you need help using the phone?  No   Are you deaf or do you have serious difficulty hearing?  No   Do you need help with transportation? No   Do you need help shopping? No   Do you need help preparing meals?  No   Do you need help with housework?  No   Do you need help with laundry? No    Do you need help taking your medications? No   Do you need help managing money? No   Do you ever drive or ride in a car without wearing a seat belt? No   Have you felt unusual stress, anger or loneliness in the last month? No   Who do you live with? Spouse   If you need help, do you have trouble finding someone available to you? No   Have you been bothered in the last four weeks by sexual problems? No   Do you have difficulty concentrating, remembering or making decisions? No           Does the patient have evidence of cognitive impairment? No    Aspirin use counseling? Taking ASA appropriately as indicated      Recent Lab Results:  CMP:  Lab Results   Component Value Date    BUN 15 11/06/2017    CREATININE 0.88 11/06/2017    EGFRIFNONA 87 11/06/2017    BCR 17.0 11/06/2017     11/06/2017    K 4.3 11/06/2017    CO2 28.3 11/06/2017    CALCIUM 9.3 11/06/2017    ALBUMIN 4.50 11/06/2017    BILITOT 0.7 11/06/2017    ALKPHOS 64 11/06/2017    AST 26 11/06/2017    ALT 20 11/06/2017     Lipid Panel:     HbA1c:       Visual Acuity:   Visual Acuity Screening    Right eye Left eye Both eyes   Without correction:      With correction:   20/20       Age-appropriate Screening Schedule:  Refer to the list below for future screening recommendations based on patient's age, sex and/or medical conditions. Orders for these recommended tests are listed in the plan section. The patient has been provided with a written plan.    Health Maintenance   Topic Date Due   • ZOSTER VACCINE (2 of 2) 07/27/2015   • PNEUMOCOCCAL VACCINES (65+ LOW/MEDIUM RISK) (1 of 2 - PCV13) 07/09/2016   • TDAP/TD VACCINES (2 - Td) 05/09/2026   • COLONOSCOPY  01/13/2027   • INFLUENZA VACCINE  Completed        Chief Complaint   Patient presents with   • Welcome To Medicare       Subjective   History of Present Illness    Jama Birch is a 68 y.o. male an established patient presenting for a Welcome to Medicare Visit.     The following portions of the  "patient's history were reviewed and updated as appropriate: allergies, current medications, past family history, past medical history, past social history, past surgical history and problem list.    Outpatient Medications Prior to Visit   Medication Sig Dispense Refill   • aspirin 81 MG tablet Take 81 mg by mouth daily.     • Omega-3 Fatty Acids (FISH OIL EXTRA STRENGTH) 1200 MG capsule Take 1 capsule by mouth.     • HYDROcodone-acetaminophen (NORCO) 5-325 MG per tablet Take 1 tablet by mouth Every 6 (Six) Hours As Needed for Moderate Pain . 20 tablet 0   • Saw Palmetto 450 MG capsule Take 1 capsule by mouth.       No facility-administered medications prior to visit.        Patient Active Problem List   Diagnosis   • Lipoma of torso   • Nocturia   • History of kidney stones       Advance Care Planning:  has an advance directive - a copy HAS NOT been provided. Have asked the patient to send this to us to add to record.    Identification of Risk Factors:  Risk factors include: weight  and increased fall risk.    Review of Systems    Compared to one year ago, the patient feels his physical health is the same.  Compared to one year ago, the patient feels his mental health is the same.    Objective    Physical Exam    Vitals:    02/28/19 1058   BP: 123/75   Pulse: 58   Resp: 16   Temp: 97.8 °F (36.6 °C)   TempSrc: Oral   Weight: 80.3 kg (177 lb)   Height: 175.3 cm (69\")   PainSc: 0-No pain       Patient's Body mass index is 26.14 kg/m². BMI is above normal parameters. Recommendations include: exercise counseling and nutrition counseling.      Procedure     ECG 12 Lead  Date/Time: 2/28/2019 11:47 AM  Performed by: Jama Moses MD  Authorized by: Jama Moses MD   Comparison: not compared with previous ECG   Previous ECG: no previous ECG available  Rhythm: sinus bradycardia  Rate: normal  QRS axis: normal  Other: no other findings    Clinical impression: normal ECG               Assessment/Plan   Patient " Self-Management and Personalized Health Advice  The patient has been provided with information about: diet, exercise, weight management, prevention of cardiac or vascular disease and fall prevention and preventive services including:   · Exercise counseling provided, Fall Risk assessment done, Nutrition counseling provided, Prostate cancer screening discussed, Screening electrocardiogram, shingrix.    Problem List Items Addressed This Visit     None      Visit Diagnoses     Medicare welcome exam    -  Primary    Information on fall prevention, diet and exercise, and immunization shared with patient.    Need for pneumococcal vaccine        Relevant Orders    Pneumococcal Conjugate Vaccine 13-Valent All (PCV13)          Orders Placed This Encounter   Procedures   • Pneumococcal Conjugate Vaccine 13-Valent All (PCV13)   • ECG 12 Lead     This order was created via procedure documentation       Outpatient Encounter Medications as of 2/28/2019   Medication Sig Dispense Refill   • aspirin 81 MG tablet Take 81 mg by mouth daily.     • Omega-3 Fatty Acids (FISH OIL EXTRA STRENGTH) 1200 MG capsule Take 1 capsule by mouth.     • [DISCONTINUED] HYDROcodone-acetaminophen (NORCO) 5-325 MG per tablet Take 1 tablet by mouth Every 6 (Six) Hours As Needed for Moderate Pain . 20 tablet 0   • [DISCONTINUED] Saw Palmetto 450 MG capsule Take 1 capsule by mouth.       No facility-administered encounter medications on file as of 2/28/2019.        Reviewed use of high risk medication in the elderly: no  Reviewed for potential of harmful drug interactions in the elderly: not applicable    Follow Up:  Return in about 1 year (around 3/2/2020) for Medicare Wellness Visit.     An After Visit Summary and PPPS with all of these plans were given to the patient.

## 2019-03-18 ENCOUNTER — TRANSCRIBE ORDERS (OUTPATIENT)
Dept: ADMINISTRATIVE | Facility: HOSPITAL | Age: 69
End: 2019-03-18

## 2019-03-18 DIAGNOSIS — Z13.9 VISIT FOR SCREENING: Primary | ICD-10-CM

## 2019-03-21 ENCOUNTER — HOSPITAL ENCOUNTER (OUTPATIENT)
Dept: CARDIOLOGY | Facility: HOSPITAL | Age: 69
Discharge: HOME | End: 2019-03-21
Admitting: FAMILY MEDICINE

## 2019-03-21 VITALS
DIASTOLIC BLOOD PRESSURE: 68 MMHG | SYSTOLIC BLOOD PRESSURE: 107 MMHG | WEIGHT: 171 LBS | HEART RATE: 70 BPM | HEIGHT: 68 IN | BODY MASS INDEX: 25.91 KG/M2

## 2019-03-21 DIAGNOSIS — Z13.9 VISIT FOR SCREENING: ICD-10-CM

## 2019-03-21 LAB
BH CV ECHO MEAS - DIST AO DIAM: 1.55 CM
BH CV VAS BP LEFT ARM: NORMAL MMHG
BH CV VAS BP RIGHT ARM: NORMAL MMHG
BH CV XLRA MEAS - MID AO DIAM: 1.76 CM
BH CV XLRA MEAS - PAD LEFT ABI DP: 1.25
BH CV XLRA MEAS - PAD LEFT ABI PT: 1.27
BH CV XLRA MEAS - PAD LEFT ARM: 107 MMHG
BH CV XLRA MEAS - PAD LEFT LEG DP: 134 MMHG
BH CV XLRA MEAS - PAD LEFT LEG PT: 136 MMHG
BH CV XLRA MEAS - PAD RIGHT ABI DP: 1.27
BH CV XLRA MEAS - PAD RIGHT ABI PT: 1.25
BH CV XLRA MEAS - PAD RIGHT ARM: 107 MMHG
BH CV XLRA MEAS - PAD RIGHT LEG DP: 136 MMHG
BH CV XLRA MEAS - PAD RIGHT LEG PT: 134 MMHG
BH CV XLRA MEAS - PROX AO DIAM: 2.06 CM
BH CV XLRA MEAS LEFT ICA/CCA RATIO: 1.17
BH CV XLRA MEAS LEFT MID CCA PSV: NORMAL CM/SEC
BH CV XLRA MEAS LEFT MID ICA PSV: NORMAL CM/SEC
BH CV XLRA MEAS LEFT PROX ECA PSV: NORMAL CM/SEC
BH CV XLRA MEAS RIGHT ICA/CCA RATIO: 0.68
BH CV XLRA MEAS RIGHT MID CCA PSV: NORMAL CM/SEC
BH CV XLRA MEAS RIGHT MID ICA PSV: NORMAL CM/SEC
BH CV XLRA MEAS RIGHT PROX ECA PSV: NORMAL CM/SEC

## 2019-03-21 PROCEDURE — 93799 UNLISTED CV SVC/PROCEDURE: CPT

## 2019-03-25 ENCOUNTER — OFFICE VISIT (OUTPATIENT)
Dept: FAMILY MEDICINE CLINIC | Facility: CLINIC | Age: 69
End: 2019-03-25

## 2019-03-25 VITALS
BODY MASS INDEX: 26.98 KG/M2 | HEART RATE: 77 BPM | HEIGHT: 68 IN | DIASTOLIC BLOOD PRESSURE: 83 MMHG | SYSTOLIC BLOOD PRESSURE: 126 MMHG | RESPIRATION RATE: 16 BRPM | OXYGEN SATURATION: 97 % | TEMPERATURE: 99.1 F | WEIGHT: 178 LBS

## 2019-03-25 DIAGNOSIS — R07.89 OTHER CHEST PAIN: Primary | ICD-10-CM

## 2019-03-25 DIAGNOSIS — R94.31 EKG ABNORMALITIES: ICD-10-CM

## 2019-03-25 PROBLEM — I65.23 ATHEROSCLEROSIS OF BOTH CAROTID ARTERIES: Status: ACTIVE | Noted: 2019-03-25

## 2019-03-25 PROCEDURE — 93000 ELECTROCARDIOGRAM COMPLETE: CPT | Performed by: FAMILY MEDICINE

## 2019-03-25 PROCEDURE — 99215 OFFICE O/P EST HI 40 MIN: CPT | Performed by: FAMILY MEDICINE

## 2019-03-25 NOTE — PROGRESS NOTES
"Chief Complaint   Patient presents with   • Chest Pain       Subjective   This patient presents the office with intermittent chest pain.  Intensity of chest tightness is 7-8 out of 10.  Currently he is without chest pain symptoms.  Duration is been over the past 3 weeks.  The chest tightness occurs after he has been on the treadmill.  He is in runner and this is not a new exercise.  He notices more of the chest tightness early in the exercise.  He has not had associated nausea during the chest pain.  Recently he has noticed more burping symptoms.  The chest discomfort does not radiate.  He does have some associated lightheadedness along with the chest tightness and shortness of breath.  Recovery happens within a few minutes of stopping the exercise.  His exercise tolerance is slightly diminished.  I have reviewed and updated his medications, medical history and problem list during today's office visit.     Patient Care Team:  Jama Moses MD as PCP - General (Family Medicine)  Alfie Hooks MD as Consulting Physician (General Surgery)  Neri Lin MD as Consulting Physician (Ophthalmology)  Jama Tobin (Dental General Practice)    Social History     Tobacco Use   • Smoking status: Never Smoker   • Smokeless tobacco: Never Used   Substance Use Topics   • Alcohol use: Yes     Alcohol/week: 0.6 oz     Types: 1 drink(s) per week     Frequency: Monthly or less     Drinks per session: 1 or 2     Binge frequency: Never     Comment: 3/month       Review of Systems   Respiratory: Positive for shortness of breath ( See HPI).    Cardiovascular: Positive for chest pain ( See HPI).   Gastrointestinal: Negative for nausea.        See hpi     All other systems reviewed and are negative.      Objective     /83   Pulse 77   Temp 99.1 °F (37.3 °C) (Oral)   Resp 16   Ht 172.7 cm (68\")   Wt 80.7 kg (178 lb)   SpO2 97%   BMI 27.06 kg/m²     Body mass index is 27.06 kg/m².    Physical Exam   Constitutional: He " is oriented to person, place, and time. He appears well-developed. No distress.   Eyes: Conjunctivae and lids are normal.   Neck: Carotid bruit is not present.   Cardiovascular: Normal rate, regular rhythm and normal heart sounds.   Pulmonary/Chest: Effort normal and breath sounds normal.   Musculoskeletal: He exhibits no edema.   Neurological: He is alert and oriented to person, place, and time.   Skin: Skin is warm and dry.   Psychiatric: He has a normal mood and affect. His behavior is normal.   Vitals reviewed.       Data Reviewed:    ECG 12 Lead  Date/Time: 3/25/2019 3:25 PM  Performed by: Jama Moses MD  Authorized by: Jama Moses MD   Comparison: compared with previous ECG from 2/28/2019  Comparison to previous ECG: Previous EKG showed bradycardia.  Minimal changes noted on current EKG as documented below.  Rhythm: sinus rhythm  Rate: normal  Conduction: 1st degree AV block  Q waves: V1    ST Elevation: III (1 mm, possible J-point elevation)  QRS axis: normal  Clinical impression comment: Subtle changes consistent will with possible septal infarct.                            Assessment/Plan     Problem List Items Addressed This Visit     None      Visit Diagnoses     Other chest pain    -  Primary    to chest pain clinic today    Relevant Orders    Ambulatory Referral to Cardiology    EKG abnormalities        Relevant Orders    Ambulatory Referral to Cardiology          Orders Placed This Encounter   Procedures   • Ambulatory Referral to Cardiology     Referral Priority:   Urgent     Referral Type:   Consultation     Referral Reason:   Specialty Services Required     Referred to Provider:   Alfredo Sebastian MD     Requested Specialty:   Cardiology     Number of Visits Requested:   1   • ECG 12 Lead     This order was created via procedure documentation         Current Outpatient Medications:   •  aspirin 81 MG tablet, Take 81 mg by mouth daily., Disp: , Rfl:   •  Omega-3 Fatty Acids (FISH OIL EXTRA  STRENGTH) 1200 MG capsule, Take 1 capsule by mouth., Disp: , Rfl:     Return if symptoms worsen or fail to improve.

## 2019-03-26 ENCOUNTER — HOSPITAL ENCOUNTER (OUTPATIENT)
Dept: CARDIOLOGY | Facility: HOSPITAL | Age: 69
Discharge: HOME | End: 2019-03-26

## 2019-03-26 ENCOUNTER — HOSPITAL ENCOUNTER (OUTPATIENT)
Facility: HOSPITAL | Age: 69
Discharge: HOME | End: 2019-03-27
Attending: INTERNAL MEDICINE | Admitting: INTERNAL MEDICINE

## 2019-03-26 VITALS
BODY MASS INDEX: 26.98 KG/M2 | DIASTOLIC BLOOD PRESSURE: 65 MMHG | OXYGEN SATURATION: 97 % | SYSTOLIC BLOOD PRESSURE: 111 MMHG | WEIGHT: 178 LBS | HEIGHT: 68 IN | HEART RATE: 69 BPM

## 2019-03-26 DIAGNOSIS — R07.2 PRECORDIAL PAIN: Primary | ICD-10-CM

## 2019-03-26 DIAGNOSIS — I21.4 NSTEMI, INITIAL EPISODE OF CARE (HCC): ICD-10-CM

## 2019-03-26 DIAGNOSIS — R06.02 SHORTNESS OF BREATH: ICD-10-CM

## 2019-03-26 DIAGNOSIS — R94.31 ABNORMAL ECG: Primary | ICD-10-CM

## 2019-03-26 DIAGNOSIS — R07.2 PRECORDIAL PAIN: ICD-10-CM

## 2019-03-26 LAB
ALBUMIN SERPL-MCNC: 4 G/DL (ref 3.5–5.2)
ALBUMIN/GLOB SERPL: 1.3 G/DL
ALP SERPL-CCNC: 65 U/L (ref 39–117)
ALT SERPL W P-5'-P-CCNC: 21 U/L (ref 1–41)
ANION GAP SERPL CALCULATED.3IONS-SCNC: 10.5 MMOL/L
AST SERPL-CCNC: 21 U/L (ref 1–40)
BASOPHILS # BLD AUTO: 0.06 10*3/MM3 (ref 0–0.2)
BASOPHILS NFR BLD AUTO: 1.5 % (ref 0–1.5)
BILIRUB SERPL-MCNC: 0.3 MG/DL (ref 0.2–1.2)
BUN BLD-MCNC: 17 MG/DL (ref 8–23)
BUN/CREAT SERPL: 20.7 (ref 7–25)
CALCIUM SPEC-SCNC: 9 MG/DL (ref 8.6–10.5)
CHLORIDE SERPL-SCNC: 104 MMOL/L (ref 98–107)
CO2 SERPL-SCNC: 27.5 MMOL/L (ref 22–29)
CREAT BLD-MCNC: 0.82 MG/DL (ref 0.76–1.27)
D DIMER PPP FEU-MCNC: 0.49 MCGFEU/ML (ref 0–0.49)
DEPRECATED RDW RBC AUTO: 41.8 FL (ref 37–54)
EOSINOPHIL # BLD AUTO: 0.1 10*3/MM3 (ref 0–0.4)
EOSINOPHIL NFR BLD AUTO: 2.4 % (ref 0.3–6.2)
ERYTHROCYTE [DISTWIDTH] IN BLOOD BY AUTOMATED COUNT: 12.5 % (ref 12.3–15.4)
GFR SERPL CREATININE-BSD FRML MDRD: 94 ML/MIN/1.73
GLOBULIN UR ELPH-MCNC: 3.2 GM/DL
GLUCOSE BLD-MCNC: 100 MG/DL (ref 65–99)
HCT VFR BLD AUTO: 44.1 % (ref 37.5–51)
HGB BLD-MCNC: 14.8 G/DL (ref 13–17.7)
IMM GRANULOCYTES # BLD AUTO: 0.01 10*3/MM3 (ref 0–0.05)
IMM GRANULOCYTES NFR BLD AUTO: 0.2 % (ref 0–0.5)
LYMPHOCYTES # BLD AUTO: 0.93 10*3/MM3 (ref 0.7–3.1)
LYMPHOCYTES NFR BLD AUTO: 22.6 % (ref 19.6–45.3)
MCH RBC QN AUTO: 30.8 PG (ref 26.6–33)
MCHC RBC AUTO-ENTMCNC: 33.6 G/DL (ref 31.5–35.7)
MCV RBC AUTO: 91.7 FL (ref 79–97)
MONOCYTES # BLD AUTO: 0.5 10*3/MM3 (ref 0.1–0.9)
MONOCYTES NFR BLD AUTO: 12.2 % (ref 5–12)
NEUTROPHILS # BLD AUTO: 2.51 10*3/MM3 (ref 1.4–7)
NEUTROPHILS NFR BLD AUTO: 61.1 % (ref 42.7–76)
NRBC BLD AUTO-RTO: 0 /100 WBC (ref 0–0)
NT-PROBNP SERPL-MCNC: 221.5 PG/ML (ref 5–900)
PLATELET # BLD AUTO: 199 10*3/MM3 (ref 140–450)
PMV BLD AUTO: 9.6 FL (ref 6–12)
POTASSIUM BLD-SCNC: 4.6 MMOL/L (ref 3.5–5.2)
PROT SERPL-MCNC: 7.2 G/DL (ref 6–8.5)
RBC # BLD AUTO: 4.81 10*6/MM3 (ref 4.14–5.8)
SODIUM BLD-SCNC: 142 MMOL/L (ref 136–145)
TROPONIN T SERPL-MCNC: 0.33 NG/ML (ref 0–0.03)
WBC NRBC COR # BLD: 4.11 10*3/MM3 (ref 3.4–10.8)

## 2019-03-26 PROCEDURE — 85347 COAGULATION TIME ACTIVATED: CPT

## 2019-03-26 PROCEDURE — 83880 ASSAY OF NATRIURETIC PEPTIDE: CPT | Performed by: INTERNAL MEDICINE

## 2019-03-26 PROCEDURE — 93458 L HRT ARTERY/VENTRICLE ANGIO: CPT | Performed by: INTERNAL MEDICINE

## 2019-03-26 PROCEDURE — 25010000002 FENTANYL CITRATE (PF) 100 MCG/2ML SOLUTION: Performed by: INTERNAL MEDICINE

## 2019-03-26 PROCEDURE — 92928 PRQ TCAT PLMT NTRAC ST 1 LES: CPT | Performed by: INTERNAL MEDICINE

## 2019-03-26 PROCEDURE — 25010000002 BH (CUPID ONLY) ADENOSINE 6 MG/100ML MIXTURE: Performed by: INTERNAL MEDICINE

## 2019-03-26 PROCEDURE — 36415 COLL VENOUS BLD VENIPUNCTURE: CPT

## 2019-03-26 PROCEDURE — 99153 MOD SED SAME PHYS/QHP EA: CPT | Performed by: INTERNAL MEDICINE

## 2019-03-26 PROCEDURE — 93005 ELECTROCARDIOGRAM TRACING: CPT | Performed by: INTERNAL MEDICINE

## 2019-03-26 PROCEDURE — C1725 CATH, TRANSLUMIN NON-LASER: HCPCS | Performed by: INTERNAL MEDICINE

## 2019-03-26 PROCEDURE — 84484 ASSAY OF TROPONIN QUANT: CPT | Performed by: INTERNAL MEDICINE

## 2019-03-26 PROCEDURE — 0 IOPAMIDOL PER 1 ML: Performed by: INTERNAL MEDICINE

## 2019-03-26 PROCEDURE — C1769 GUIDE WIRE: HCPCS | Performed by: INTERNAL MEDICINE

## 2019-03-26 PROCEDURE — 85025 COMPLETE CBC W/AUTO DIFF WBC: CPT | Performed by: INTERNAL MEDICINE

## 2019-03-26 PROCEDURE — C1887 CATHETER, GUIDING: HCPCS | Performed by: INTERNAL MEDICINE

## 2019-03-26 PROCEDURE — 99220 PR INITIAL OBSERVATION CARE/DAY 70 MINUTES: CPT | Performed by: INTERNAL MEDICINE

## 2019-03-26 PROCEDURE — 94760 N-INVAS EAR/PLS OXIMETRY 1: CPT

## 2019-03-26 PROCEDURE — C1894 INTRO/SHEATH, NON-LASER: HCPCS | Performed by: INTERNAL MEDICINE

## 2019-03-26 PROCEDURE — C1874 STENT, COATED/COV W/DEL SYS: HCPCS | Performed by: INTERNAL MEDICINE

## 2019-03-26 PROCEDURE — 80053 COMPREHEN METABOLIC PANEL: CPT | Performed by: INTERNAL MEDICINE

## 2019-03-26 PROCEDURE — G0378 HOSPITAL OBSERVATION PER HR: HCPCS

## 2019-03-26 PROCEDURE — 25010000002 MIDAZOLAM PER 1 MG: Performed by: INTERNAL MEDICINE

## 2019-03-26 PROCEDURE — 93010 ELECTROCARDIOGRAM REPORT: CPT | Performed by: INTERNAL MEDICINE

## 2019-03-26 PROCEDURE — 25010000002 HEPARIN (PORCINE) PER 1000 UNITS: Performed by: INTERNAL MEDICINE

## 2019-03-26 PROCEDURE — C9600 PERC DRUG-EL COR STENT SING: HCPCS | Performed by: INTERNAL MEDICINE

## 2019-03-26 PROCEDURE — 85379 FIBRIN DEGRADATION QUANT: CPT | Performed by: INTERNAL MEDICINE

## 2019-03-26 PROCEDURE — 99152 MOD SED SAME PHYS/QHP 5/>YRS: CPT | Performed by: INTERNAL MEDICINE

## 2019-03-26 DEVICE — XIENCE SIERRA™ EVEROLIMUS ELUTING CORONARY STENT SYSTEM 3.50 MM X 28 MM / RAPID-EXCHANGE
Type: IMPLANTABLE DEVICE | Status: FUNCTIONAL
Brand: XIENCE SIERRA™

## 2019-03-26 RX ORDER — NITROGLYCERIN 0.4 MG/1
0.4 TABLET SUBLINGUAL
Status: DISCONTINUED | OUTPATIENT
Start: 2019-03-26 | End: 2019-03-27 | Stop reason: HOSPADM

## 2019-03-26 RX ORDER — HYDROCODONE BITARTRATE AND ACETAMINOPHEN 5; 325 MG/1; MG/1
1 TABLET ORAL EVERY 4 HOURS PRN
Status: DISCONTINUED | OUTPATIENT
Start: 2019-03-26 | End: 2019-03-27 | Stop reason: HOSPADM

## 2019-03-26 RX ORDER — SODIUM CHLORIDE 0.9 % (FLUSH) 0.9 %
10 SYRINGE (ML) INJECTION AS NEEDED
Status: DISCONTINUED | OUTPATIENT
Start: 2019-03-26 | End: 2019-03-27 | Stop reason: HOSPADM

## 2019-03-26 RX ORDER — ATORVASTATIN CALCIUM 20 MG/1
40 TABLET, FILM COATED ORAL NIGHTLY
Status: DISCONTINUED | OUTPATIENT
Start: 2019-03-26 | End: 2019-03-27 | Stop reason: HOSPADM

## 2019-03-26 RX ORDER — FENTANYL CITRATE 50 UG/ML
INJECTION, SOLUTION INTRAMUSCULAR; INTRAVENOUS AS NEEDED
Status: DISCONTINUED | OUTPATIENT
Start: 2019-03-26 | End: 2019-03-26 | Stop reason: HOSPADM

## 2019-03-26 RX ORDER — ONDANSETRON 4 MG/1
4 TABLET, ORALLY DISINTEGRATING ORAL EVERY 6 HOURS PRN
Status: DISCONTINUED | OUTPATIENT
Start: 2019-03-26 | End: 2019-03-27 | Stop reason: HOSPADM

## 2019-03-26 RX ORDER — ONDANSETRON 4 MG/1
4 TABLET, FILM COATED ORAL EVERY 6 HOURS PRN
Status: DISCONTINUED | OUTPATIENT
Start: 2019-03-26 | End: 2019-03-27 | Stop reason: HOSPADM

## 2019-03-26 RX ORDER — LIDOCAINE HYDROCHLORIDE 10 MG/ML
0.1 INJECTION, SOLUTION EPIDURAL; INFILTRATION; INTRACAUDAL; PERINEURAL ONCE AS NEEDED
Status: DISCONTINUED | OUTPATIENT
Start: 2019-03-26 | End: 2019-03-26 | Stop reason: HOSPADM

## 2019-03-26 RX ORDER — ACETAMINOPHEN 325 MG/1
650 TABLET ORAL EVERY 4 HOURS PRN
Status: DISCONTINUED | OUTPATIENT
Start: 2019-03-26 | End: 2019-03-27 | Stop reason: HOSPADM

## 2019-03-26 RX ORDER — MIDAZOLAM HYDROCHLORIDE 1 MG/ML
INJECTION INTRAMUSCULAR; INTRAVENOUS AS NEEDED
Status: DISCONTINUED | OUTPATIENT
Start: 2019-03-26 | End: 2019-03-26 | Stop reason: HOSPADM

## 2019-03-26 RX ORDER — TEMAZEPAM 15 MG/1
15 CAPSULE ORAL NIGHTLY PRN
Status: DISCONTINUED | OUTPATIENT
Start: 2019-03-26 | End: 2019-03-27 | Stop reason: HOSPADM

## 2019-03-26 RX ORDER — HEPARIN SODIUM 1000 [USP'U]/ML
INJECTION, SOLUTION INTRAVENOUS; SUBCUTANEOUS AS NEEDED
Status: DISCONTINUED | OUTPATIENT
Start: 2019-03-26 | End: 2019-03-26 | Stop reason: HOSPADM

## 2019-03-26 RX ORDER — SODIUM CHLORIDE 9 MG/ML
50 INJECTION, SOLUTION INTRAVENOUS CONTINUOUS
Status: ACTIVE | OUTPATIENT
Start: 2019-03-26 | End: 2019-03-27

## 2019-03-26 RX ORDER — ONDANSETRON 2 MG/ML
4 INJECTION INTRAMUSCULAR; INTRAVENOUS EVERY 6 HOURS PRN
Status: DISCONTINUED | OUTPATIENT
Start: 2019-03-26 | End: 2019-03-27 | Stop reason: HOSPADM

## 2019-03-26 RX ORDER — SODIUM CHLORIDE 0.9 % (FLUSH) 0.9 %
3 SYRINGE (ML) INJECTION EVERY 12 HOURS SCHEDULED
Status: DISCONTINUED | OUTPATIENT
Start: 2019-03-26 | End: 2019-03-26 | Stop reason: HOSPADM

## 2019-03-26 RX ORDER — ASPIRIN 81 MG/1
81 TABLET, CHEWABLE ORAL DAILY
Status: DISCONTINUED | OUTPATIENT
Start: 2019-03-26 | End: 2019-03-27 | Stop reason: HOSPADM

## 2019-03-26 RX ORDER — SODIUM CHLORIDE 0.9 % (FLUSH) 0.9 %
3-10 SYRINGE (ML) INJECTION AS NEEDED
Status: DISCONTINUED | OUTPATIENT
Start: 2019-03-26 | End: 2019-03-26 | Stop reason: HOSPADM

## 2019-03-26 RX ORDER — LIDOCAINE HYDROCHLORIDE 20 MG/ML
INJECTION, SOLUTION INFILTRATION; PERINEURAL AS NEEDED
Status: DISCONTINUED | OUTPATIENT
Start: 2019-03-26 | End: 2019-03-26 | Stop reason: HOSPADM

## 2019-03-26 RX ORDER — SODIUM CHLORIDE 9 MG/ML
75 INJECTION, SOLUTION INTRAVENOUS CONTINUOUS
Status: DISCONTINUED | OUTPATIENT
Start: 2019-03-26 | End: 2019-03-26

## 2019-03-26 RX ADMIN — NITROGLYCERIN 1 INCH: 20 OINTMENT TOPICAL at 12:09

## 2019-03-26 RX ADMIN — ATORVASTATIN CALCIUM 40 MG: 20 TABLET, FILM COATED ORAL at 20:28

## 2019-03-26 RX ADMIN — SODIUM CHLORIDE 75 ML/HR: 9 INJECTION, SOLUTION INTRAVENOUS at 12:46

## 2019-03-26 RX ADMIN — SODIUM CHLORIDE 50 ML/HR: 9 INJECTION, SOLUTION INTRAVENOUS at 19:01

## 2019-03-26 NOTE — PROGRESS NOTES
Subjective:     Encounter Date:03/26/2019      Patient ID: Jama Birch is a 68 y.o. male.    Chief Complaint: CP, SOB, abn ECG  History of Present Illness    Dear Dr. Moses,    I had the pleasure of seeing this patient in the Cardiac Evaluation Clinic today for evaluation of crescendo chest discomfort.  I appreciate that you sent him to see us.    This patient has no known cardiac history.  This patient has no history of coronary artery disease, congestive heart failure, rheumatic fever, rheumatic heart disease, congenital heart disease or heart murmur.  This patient has never required invasive cardiovascular evaluation.    He has been having recurrent episodes of the mid precordial chest heaviness and tightness.  He has been noticing almost exclusively when he is walking on treadmill.  He will get this heavy tightness that radiates to his jaw with achiness in his jaw.  Is been having some left arm discomfort when he has that as well.  He does get dyspneic when he has this.  When he has been exercising he then slows his pace and will walk slowly until the pain resolves and then he will start exercising again and he can complete his walk.  A week ago he had 2 episodes that woke him up where he had the same discomfort.  He has had some clamminess with it but no diaphoresis.  He has not had any nausea.  He states the last time he felt anything was when he was walking on a treadmill a week ago.  He denies any discomfort at all over the last week.    He was in your office and EKG showed change from before.  He was then sent here.    Currently the patient denies any chest discomfort at all.    He does not have any palpitations or tachycardia.  No presyncope or syncope.  No orthopnea or PND.  He has not had any lower extremity edema.    The following portions of the patient's history were reviewed and updated as appropriate: allergies, current medications, past family history, past medical history, past social  history, past surgical history and problem list.    Past Medical History:   Diagnosis Date   • Abnormal CT scan; splenic flexure thickening 2016   • Kidney stone    • Prostate disorder        Past Surgical History:   Procedure Laterality Date   • COLONOSCOPY  2017    second       Social History     Socioeconomic History   • Marital status:      Spouse name: Tracey   • Number of children: 3   • Years of education: Not on file   • Highest education level: Not on file   Occupational History   • Occupation: [employer]     Comment:    Social Needs   • Financial resource strain: Not hard at all   • Food insecurity:     Worry: Never true     Inability: Never true   • Transportation needs:     Medical: No     Non-medical: No   Tobacco Use   • Smoking status: Never Smoker   • Smokeless tobacco: Never Used   Substance and Sexual Activity   • Alcohol use: Yes     Alcohol/week: 0.6 oz     Types: 1 drink(s) per week     Frequency: Monthly or less     Drinks per session: 1 or 2     Binge frequency: Never     Comment: 3/month   • Drug use: No   • Sexual activity: Yes     Partners: Female     Birth control/protection: Other   Lifestyle   • Physical activity:     Days per week: 6 days     Minutes per session: 40 min   • Stress: Only a little   Relationships   • Social connections:     Talks on phone: More than three times a week     Gets together: Once a week     Attends Faith service: More than 4 times per year     Active member of club or organization: Yes     Attends meetings of clubs or organizations: More than 4 times per year     Relationship status:    Social History Narrative    Anniversary 1976     82 Coby, 85 Ernesto, 9/3/86 Lizbeth    5 Grandchildren       Review of Systems   Constitution: Negative for chills, decreased appetite, fever and night sweats.   HENT: Negative for ear discharge, ear pain, hearing loss, nosebleeds and sore throat.    Eyes: Negative for  "blurred vision, double vision and pain.   Cardiovascular: Negative for cyanosis.   Respiratory: Negative for hemoptysis and sputum production.    Endocrine: Negative for cold intolerance and heat intolerance.   Hematologic/Lymphatic: Negative for adenopathy.   Skin: Negative for dry skin, itching, nail changes, rash and suspicious lesions.   Musculoskeletal: Negative for arthritis, gout, muscle cramps, muscle weakness, myalgias and neck pain.   Gastrointestinal: Negative for anorexia, bowel incontinence, constipation, diarrhea, dysphagia, hematemesis and jaundice.   Genitourinary: Negative for bladder incontinence, dysuria, flank pain, frequency, hematuria and nocturia.   Neurological: Negative for focal weakness, numbness, paresthesias and seizures.   Psychiatric/Behavioral: Negative for altered mental status, hallucinations, hypervigilance, suicidal ideas and thoughts of violence.   Allergic/Immunologic: Negative for persistent infections.         ECG 12 Lead  Date/Time: 3/26/2019 12:07 PM  Performed by: Vicente Schmitz III, MD  Authorized by: Vicente Schmitz III, MD   Comparison: compared with previous ECG   Comparison to previous ECG: Changes in V1 and V2 are more prominent than prior  Rhythm: sinus rhythm  Rate: normal  Conduction: conduction normal  Q waves: V1    ST Elevation: V1 and V2  T inversion: V1 and V2  QRS axis: normal    Clinical impression: abnormal EKG               Objective:     Vitals:    03/26/19 1036 03/26/19 1038   BP: 118/67 111/65   BP Location: Left arm Right arm   Patient Position: Sitting Sitting   Pulse: 69    SpO2: 97%    Weight: 80.7 kg (178 lb)    Height: 172.7 cm (68\")          Physical Exam   Constitutional: He is oriented to person, place, and time. He appears well-developed and well-nourished. No distress.   HENT:   Head: Normocephalic and atraumatic.   Nose: Nose normal.   Mouth/Throat: Oropharynx is clear and moist.   Eyes: Conjunctivae and EOM are normal. Pupils are equal, " round, and reactive to light. Right eye exhibits no discharge. Left eye exhibits no discharge.   Neck: Normal range of motion. Neck supple. No tracheal deviation present. No thyromegaly present.   Cardiovascular: Normal rate, regular rhythm, S1 normal, S2 normal, normal heart sounds and normal pulses. Exam reveals no S3.   Pulmonary/Chest: Effort normal and breath sounds normal. No stridor. No respiratory distress. He exhibits no tenderness.   Abdominal: Soft. Bowel sounds are normal. He exhibits no distension and no mass. There is no tenderness. There is no rebound and no guarding.   Musculoskeletal: Normal range of motion. He exhibits no tenderness or deformity.   Lymphadenopathy:     He has no cervical adenopathy.   Neurological: He is alert and oriented to person, place, and time. He has normal reflexes.   Skin: Skin is warm and dry. No rash noted. He is not diaphoretic. No erythema.   Psychiatric: He has a normal mood and affect. Thought content normal.       Lab Review:           Lab Results   Component Value Date    GLUCOSE 100 (H) 03/26/2019    BUN 17 03/26/2019    CREATININE 0.82 03/26/2019    EGFRIFNONA 94 03/26/2019    BCR 20.7 03/26/2019    K 4.6 03/26/2019    CO2 27.5 03/26/2019    CALCIUM 9.0 03/26/2019    ALBUMIN 4.00 03/26/2019    AST 21 03/26/2019    ALT 21 03/26/2019     WBC   Date Value Ref Range Status   03/26/2019 4.11 3.40 - 10.80 10*3/mm3 Final     RBC   Date Value Ref Range Status   03/26/2019 4.81 4.14 - 5.80 10*6/mm3 Final     Hemoglobin   Date Value Ref Range Status   03/26/2019 14.8 13.0 - 17.7 g/dL Final     Hematocrit   Date Value Ref Range Status   03/26/2019 44.1 37.5 - 51.0 % Final     MCV   Date Value Ref Range Status   03/26/2019 91.7 79.0 - 97.0 fL Final     MCH   Date Value Ref Range Status   03/26/2019 30.8 26.6 - 33.0 pg Final     MCHC   Date Value Ref Range Status   03/26/2019 33.6 31.5 - 35.7 g/dL Final     RDW   Date Value Ref Range Status   03/26/2019 12.5 12.3 - 15.4 %  Final     RDW-SD   Date Value Ref Range Status   03/26/2019 41.8 37.0 - 54.0 fl Final     MPV   Date Value Ref Range Status   03/26/2019 9.6 6.0 - 12.0 fL Final     Platelets   Date Value Ref Range Status   03/26/2019 199 140 - 450 10*3/mm3 Final     Neutrophil %   Date Value Ref Range Status   03/26/2019 61.1 42.7 - 76.0 % Final     Lymphocyte %   Date Value Ref Range Status   03/26/2019 22.6 19.6 - 45.3 % Final     Monocyte %   Date Value Ref Range Status   03/26/2019 12.2 (H) 5.0 - 12.0 % Final     Eosinophil %   Date Value Ref Range Status   03/26/2019 2.4 0.3 - 6.2 % Final     Basophil %   Date Value Ref Range Status   03/26/2019 1.5 0.0 - 1.5 % Final     Immature Grans %   Date Value Ref Range Status   03/26/2019 0.2 0.0 - 0.5 % Final     Neutrophils, Absolute   Date Value Ref Range Status   03/26/2019 2.51 1.40 - 7.00 10*3/mm3 Final     Lymphocytes, Absolute   Date Value Ref Range Status   03/26/2019 0.93 0.70 - 3.10 10*3/mm3 Final     Monocytes, Absolute   Date Value Ref Range Status   03/26/2019 0.50 0.10 - 0.90 10*3/mm3 Final     Eosinophils, Absolute   Date Value Ref Range Status   03/26/2019 0.10 0.00 - 0.40 10*3/mm3 Final     Basophils, Absolute   Date Value Ref Range Status   03/26/2019 0.06 0.00 - 0.20 10*3/mm3 Final     Immature Grans, Absolute   Date Value Ref Range Status   03/26/2019 0.01 0.00 - 0.05 10*3/mm3 Final     nRBC   Date Value Ref Range Status   03/26/2019 0.0 0.0 - 0.0 /100 WBC Final       Performed        Assessment:          Diagnosis Plan   1. Abnormal ECG     2. Precordial pain  Cardiac Monitoring    Vital Signs - Once    Vital Signs - As Needed    Pulse Oximetry    Insert Peripheral IV    sodium chloride 0.9 % flush 10 mL    nitroglycerin (NITROSTAT) SL tablet 0.4 mg    NPO Diet    Bathroom Privileges With Assistance    CBC & Differential    Comprehensive Metabolic Panel    Troponin T    D-Dimer    proBNP    ECG 12 Lead    Cardiac Monitoring    Vital Signs - Once    Insert  Peripheral IV    NPO Diet    Bathroom Privileges With Assistance    Troponin T    Troponin T    D-Dimer    D-Dimer    proBNP    proBNP    CBC Auto Differential   3. Shortness of breath  Cardiac Monitoring    Vital Signs - Once    Vital Signs - As Needed    Pulse Oximetry    Insert Peripheral IV    sodium chloride 0.9 % flush 10 mL    nitroglycerin (NITROSTAT) SL tablet 0.4 mg    NPO Diet    Bathroom Privileges With Assistance    CBC & Differential    Comprehensive Metabolic Panel    Troponin T    D-Dimer    proBNP    ECG 12 Lead    Cardiac Monitoring    Vital Signs - Once    Insert Peripheral IV    NPO Diet    Bathroom Privileges With Assistance    Troponin T    Troponin T    D-Dimer    D-Dimer    proBNP    proBNP    CBC Auto Differential          Plan:       This patient presents with a classic story for angina pectoris.  EKG shows changes consistent with ischemia/infarction in the anterior leads.  Initial troponin is elevated at 0.329.  Presentation ECG and troponin levels are consistent with a non-ST segment elevation microinfarction.  Last chest pain was 7 days ago-it is uncertain whether he had a more recent infarct or whether this troponin elevation represents more distant infarction.  Patient is currently pain-free.  He did have aspirin today.  We will Place Nitropaste.  Blood pressure is 110/60 with a heart rate of 60, so I am not can give any beta blockade at this time.  Patient will be transferred to the cardiac catheterization lab; further evaluation will be predicated on the results.    Thank you very much for allowing us to participate in the care of this pleasant patient.  Please don't hesitate to call if I can be of assistance in any way.      Current Outpatient Medications:   •  aspirin 81 MG tablet, Take 81 mg by mouth daily., Disp: , Rfl:   •  Omega-3 Fatty Acids (FISH OIL EXTRA STRENGTH) 1200 MG capsule, Take 1 capsule by mouth., Disp: , Rfl:     Current Facility-Administered Medications:   •   nitroglycerin (NITROSTAT) SL tablet 0.4 mg, 0.4 mg, Sublingual, Q5 Min PRN, Vicente Schmitz III, MD  •  sodium chloride 0.9 % flush 10 mL, 10 mL, Intravenous, PRN, Vicente Schmitz III, MD

## 2019-03-27 ENCOUNTER — APPOINTMENT (OUTPATIENT)
Dept: CARDIOLOGY | Facility: HOSPITAL | Age: 69
End: 2019-03-27

## 2019-03-27 VITALS
HEIGHT: 68 IN | WEIGHT: 178 LBS | SYSTOLIC BLOOD PRESSURE: 134 MMHG | BODY MASS INDEX: 26.98 KG/M2 | OXYGEN SATURATION: 96 % | TEMPERATURE: 97.6 F | HEART RATE: 87 BPM | RESPIRATION RATE: 21 BRPM | DIASTOLIC BLOOD PRESSURE: 69 MMHG

## 2019-03-27 LAB
ACT BLD: >1000 SECONDS (ref 82–152)
ANION GAP SERPL CALCULATED.3IONS-SCNC: 10.2 MMOL/L
AORTIC DIMENSIONLESS INDEX: 0.8 (DI)
BH CV ECHO MEAS - AO MAX PG: 4 MMHG
BH CV ECHO MEAS - AO MEAN PG (FULL): 1 MMHG
BH CV ECHO MEAS - AO MEAN PG: 3 MMHG
BH CV ECHO MEAS - AO ROOT AREA (BSA CORRECTED): 1.8
BH CV ECHO MEAS - AO ROOT AREA: 9.6 CM^2
BH CV ECHO MEAS - AO ROOT DIAM: 3.5 CM
BH CV ECHO MEAS - AO V2 MAX: 128 CM/SEC
BH CV ECHO MEAS - AO V2 MEAN: 86.8 CM/SEC
BH CV ECHO MEAS - AO V2 VTI: 26.6 CM
BH CV ECHO MEAS - AVA(I,A): 4.2 CM^2
BH CV ECHO MEAS - AVA(I,D): 4.2 CM^2
BH CV ECHO MEAS - BSA(HAYCOCK): 2 M^2
BH CV ECHO MEAS - BSA: 1.9 M^2
BH CV ECHO MEAS - BZI_BMI: 27.1 KILOGRAMS/M^2
BH CV ECHO MEAS - BZI_METRIC_HEIGHT: 172.7 CM
BH CV ECHO MEAS - BZI_METRIC_WEIGHT: 80.7 KG
BH CV ECHO MEAS - EDV(CUBED): 103.8 ML
BH CV ECHO MEAS - EDV(MOD-SP2): 104 ML
BH CV ECHO MEAS - EDV(MOD-SP4): 116 ML
BH CV ECHO MEAS - EDV(TEICH): 102.4 ML
BH CV ECHO MEAS - EF(CUBED): 74 %
BH CV ECHO MEAS - EF(MOD-BP): 61 %
BH CV ECHO MEAS - EF(MOD-SP2): 69.2 %
BH CV ECHO MEAS - EF(MOD-SP4): 56 %
BH CV ECHO MEAS - EF(TEICH): 65.8 %
BH CV ECHO MEAS - ESV(CUBED): 27 ML
BH CV ECHO MEAS - ESV(MOD-SP2): 32 ML
BH CV ECHO MEAS - ESV(MOD-SP4): 51 ML
BH CV ECHO MEAS - ESV(TEICH): 35 ML
BH CV ECHO MEAS - FS: 36.2 %
BH CV ECHO MEAS - IVS/LVPW: 1
BH CV ECHO MEAS - IVSD: 0.7 CM
BH CV ECHO MEAS - LAT PEAK E' VEL: 9 CM/SEC
BH CV ECHO MEAS - LV DIASTOLIC VOL/BSA (35-75): 59.6 ML/M^2
BH CV ECHO MEAS - LV MASS(C)D: 103.1 GRAMS
BH CV ECHO MEAS - LV MASS(C)DI: 53 GRAMS/M^2
BH CV ECHO MEAS - LV MEAN PG: 2 MMHG
BH CV ECHO MEAS - LV SYSTOLIC VOL/BSA (12-30): 26.2 ML/M^2
BH CV ECHO MEAS - LV V1 MAX: 104 CM/SEC
BH CV ECHO MEAS - LV V1 MEAN: 69.9 CM/SEC
BH CV ECHO MEAS - LV V1 VTI: 20.9 CM
BH CV ECHO MEAS - LVIDD: 4.7 CM
BH CV ECHO MEAS - LVIDS: 3 CM
BH CV ECHO MEAS - LVLD AP2: 8.9 CM
BH CV ECHO MEAS - LVLD AP4: 9.1 CM
BH CV ECHO MEAS - LVLS AP2: 7 CM
BH CV ECHO MEAS - LVLS AP4: 8 CM
BH CV ECHO MEAS - LVOT AREA (M): 5.3 CM^2
BH CV ECHO MEAS - LVOT AREA: 5.3 CM^2
BH CV ECHO MEAS - LVOT DIAM: 2.6 CM
BH CV ECHO MEAS - LVPWD: 0.7 CM
BH CV ECHO MEAS - MED PEAK E' VEL: 7 CM/SEC
BH CV ECHO MEAS - MV A DUR: 0.17 SEC
BH CV ECHO MEAS - MV A MAX VEL: 55.9 CM/SEC
BH CV ECHO MEAS - MV DEC SLOPE: 191 CM/SEC^2
BH CV ECHO MEAS - MV DEC TIME: 230 SEC
BH CV ECHO MEAS - MV E MAX VEL: 65.4 CM/SEC
BH CV ECHO MEAS - MV E/A: 1.2
BH CV ECHO MEAS - MV MEAN PG: 1 MMHG
BH CV ECHO MEAS - MV P1/2T MAX VEL: 69.4 CM/SEC
BH CV ECHO MEAS - MV P1/2T: 106.4 MSEC
BH CV ECHO MEAS - MV V2 MEAN: 45.4 CM/SEC
BH CV ECHO MEAS - MV V2 VTI: 29.7 CM
BH CV ECHO MEAS - MVA P1/2T LCG: 3.2 CM^2
BH CV ECHO MEAS - MVA(P1/2T): 2.1 CM^2
BH CV ECHO MEAS - MVA(VTI): 3.7 CM^2
BH CV ECHO MEAS - PA ACC SLOPE: 1060 CM/SEC^2
BH CV ECHO MEAS - PA ACC TIME: 0.1 SEC
BH CV ECHO MEAS - PA MAX PG (FULL): 3 MMHG
BH CV ECHO MEAS - PA MAX PG: 4.6 MMHG
BH CV ECHO MEAS - PA PR(ACCEL): 34.5 MMHG
BH CV ECHO MEAS - PA V2 MAX: 107 CM/SEC
BH CV ECHO MEAS - PULM A REVS DUR: 0.1 SEC
BH CV ECHO MEAS - PULM A REVS VEL: 28.5 CM/SEC
BH CV ECHO MEAS - PULM DIAS VEL: 43.2 CM/SEC
BH CV ECHO MEAS - PULM S/D: 1.3
BH CV ECHO MEAS - PULM SYS VEL: 58.2 CM/SEC
BH CV ECHO MEAS - RAP SYSTOLE: 3 MMHG
BH CV ECHO MEAS - RV MAX PG: 1.6 MMHG
BH CV ECHO MEAS - RV MEAN PG: 1 MMHG
BH CV ECHO MEAS - RV V1 MAX: 63.2 CM/SEC
BH CV ECHO MEAS - RV V1 MEAN: 43 CM/SEC
BH CV ECHO MEAS - RV V1 VTI: 12.9 CM
BH CV ECHO MEAS - RVSP: 22 MMHG
BH CV ECHO MEAS - SI(AO): 131.6 ML/M^2
BH CV ECHO MEAS - SI(CUBED): 39.5 ML/M^2
BH CV ECHO MEAS - SI(LVOT): 57 ML/M^2
BH CV ECHO MEAS - SI(MOD-SP2): 37 ML/M^2
BH CV ECHO MEAS - SI(MOD-SP4): 33.4 ML/M^2
BH CV ECHO MEAS - SI(TEICH): 34.6 ML/M^2
BH CV ECHO MEAS - SV(AO): 255.9 ML
BH CV ECHO MEAS - SV(CUBED): 76.8 ML
BH CV ECHO MEAS - SV(LVOT): 111 ML
BH CV ECHO MEAS - SV(MOD-SP2): 72 ML
BH CV ECHO MEAS - SV(MOD-SP4): 65 ML
BH CV ECHO MEAS - SV(TEICH): 67.4 ML
BH CV ECHO MEAS - TAPSE (>1.6): 2.6 CM2
BH CV ECHO MEAS - TR MAX VEL: 224 CM/SEC
BH CV ECHO MEASUREMENTS AVERAGE E/E' RATIO: 8.18
BH CV VAS BP RIGHT ARM: NORMAL MMHG
BH CV XLRA - RV BASE: 3.3 CM
BH CV XLRA - TDI S': 9.9 CM/SEC
BUN BLD-MCNC: 13 MG/DL (ref 8–23)
BUN/CREAT SERPL: 14.9 (ref 7–25)
CALCIUM SPEC-SCNC: 8.4 MG/DL (ref 8.6–10.5)
CHLORIDE SERPL-SCNC: 103 MMOL/L (ref 98–107)
CHOLEST SERPL-MCNC: 122 MG/DL (ref 0–200)
CO2 SERPL-SCNC: 23.8 MMOL/L (ref 22–29)
CREAT BLD-MCNC: 0.87 MG/DL (ref 0.76–1.27)
DEPRECATED RDW RBC AUTO: 42.6 FL (ref 37–54)
ERYTHROCYTE [DISTWIDTH] IN BLOOD BY AUTOMATED COUNT: 12.5 % (ref 12.3–15.4)
GFR SERPL CREATININE-BSD FRML MDRD: 88 ML/MIN/1.73
GLUCOSE BLD-MCNC: 89 MG/DL (ref 65–99)
HBA1C MFR BLD: 5.2 % (ref 4.8–5.6)
HCT VFR BLD AUTO: 42.2 % (ref 37.5–51)
HDLC SERPL-MCNC: 30 MG/DL (ref 40–60)
HGB BLD-MCNC: 14.2 G/DL (ref 13–17.7)
LDLC SERPL CALC-MCNC: 70 MG/DL (ref 0–100)
LDLC/HDLC SERPL: 2.34 {RATIO}
LEFT ATRIUM VOLUME INDEX: 20 ML/M2
LV EF 2D ECHO EST: 61 %
MCH RBC QN AUTO: 31.2 PG (ref 26.6–33)
MCHC RBC AUTO-ENTMCNC: 33.6 G/DL (ref 31.5–35.7)
MCV RBC AUTO: 92.7 FL (ref 79–97)
PLATELET # BLD AUTO: 177 10*3/MM3 (ref 140–450)
PMV BLD AUTO: 9.3 FL (ref 6–12)
POTASSIUM BLD-SCNC: 4 MMOL/L (ref 3.5–5.2)
RBC # BLD AUTO: 4.55 10*6/MM3 (ref 4.14–5.8)
SODIUM BLD-SCNC: 137 MMOL/L (ref 136–145)
TRIGL SERPL-MCNC: 109 MG/DL (ref 0–150)
VLDLC SERPL-MCNC: 21.8 MG/DL (ref 5–40)
WBC NRBC COR # BLD: 6.87 10*3/MM3 (ref 3.4–10.8)

## 2019-03-27 PROCEDURE — 93010 ELECTROCARDIOGRAM REPORT: CPT | Performed by: INTERNAL MEDICINE

## 2019-03-27 PROCEDURE — G0378 HOSPITAL OBSERVATION PER HR: HCPCS

## 2019-03-27 PROCEDURE — 80061 LIPID PANEL: CPT | Performed by: INTERNAL MEDICINE

## 2019-03-27 PROCEDURE — 93306 TTE W/DOPPLER COMPLETE: CPT

## 2019-03-27 PROCEDURE — 85027 COMPLETE CBC AUTOMATED: CPT | Performed by: INTERNAL MEDICINE

## 2019-03-27 PROCEDURE — 93005 ELECTROCARDIOGRAM TRACING: CPT | Performed by: INTERNAL MEDICINE

## 2019-03-27 PROCEDURE — 93306 TTE W/DOPPLER COMPLETE: CPT | Performed by: INTERNAL MEDICINE

## 2019-03-27 PROCEDURE — 99217 PR OBSERVATION CARE DISCHARGE MANAGEMENT: CPT | Performed by: NURSE PRACTITIONER

## 2019-03-27 PROCEDURE — 83036 HEMOGLOBIN GLYCOSYLATED A1C: CPT | Performed by: INTERNAL MEDICINE

## 2019-03-27 PROCEDURE — 80048 BASIC METABOLIC PNL TOTAL CA: CPT | Performed by: INTERNAL MEDICINE

## 2019-03-27 RX ORDER — ATORVASTATIN CALCIUM 40 MG/1
40 TABLET, FILM COATED ORAL NIGHTLY
Qty: 30 TABLET | Refills: 11 | Status: SHIPPED | OUTPATIENT
Start: 2019-03-27 | End: 2020-01-16

## 2019-03-27 RX ADMIN — TICAGRELOR 90 MG: 90 TABLET ORAL at 09:34

## 2019-03-27 RX ADMIN — ASPIRIN 81 MG: 81 TABLET, CHEWABLE ORAL at 09:34

## 2019-04-04 ENCOUNTER — OFFICE VISIT (OUTPATIENT)
Dept: CARDIOLOGY | Facility: CLINIC | Age: 69
End: 2019-04-04

## 2019-04-04 VITALS
DIASTOLIC BLOOD PRESSURE: 70 MMHG | WEIGHT: 180 LBS | SYSTOLIC BLOOD PRESSURE: 124 MMHG | HEIGHT: 68 IN | BODY MASS INDEX: 27.28 KG/M2 | HEART RATE: 63 BPM

## 2019-04-04 DIAGNOSIS — I25.10 CORONARY ARTERY DISEASE INVOLVING NATIVE CORONARY ARTERY OF NATIVE HEART WITHOUT ANGINA PECTORIS: Primary | Chronic | ICD-10-CM

## 2019-04-04 DIAGNOSIS — I65.23 ATHEROSCLEROSIS OF BOTH CAROTID ARTERIES: ICD-10-CM

## 2019-04-04 PROCEDURE — 99214 OFFICE O/P EST MOD 30 MIN: CPT | Performed by: INTERNAL MEDICINE

## 2019-04-04 NOTE — PROGRESS NOTES
Subjective:     Encounter Date:04/04/2019      Patient ID: Jama Birch is a 68 y.o. male.    Chief Complaint: CAD  History of Present Illness    Dear Dr. Moses,    I had the pleasure of seeing this patient in the office today in follow-up after his recent hospitalization and cardiac catheterization.  As you know he presented with chest discomfort to the [facility].  Initial troponin was elevated 0.3.  He was admitted to the hospital, treated with oral and parenteral anti-ischemic therapy, and then had a cardiac catheterization that afternoon that demonstrated complete occlusion of the LAD in the mid segment.  This was then treated with a drug-eluting stent.    Patient states that since going home he has had complete resolution of the angina pectoris that he was experiencing beforehand.  He is back to doing about 8000 steps a day and is interested in increasing his activity back to what he was doing before.  He is not having chest pain or chest discomfort.  No shortness of breath with activity.  No tachycardia or palpitations.  Cath 3/2019:  Hemodynamics:   LV:103/4  AO: 103/62        PCI CORONARY SEGMENT: Mid LAD  PRE-STENOSIS:100  POST-STENOSIS: 0%  LESION TYPE: c  SHILOH FLOW PRE/POST: 0/3  CULPRIT LESION: Yes     Estimated blood loss: Minimal  Complications: None     Conclusions:   1. Left main: Normal  2. LAD: Acutely occluded in the midsegment  3. LCX: Dominant vessel.  Normal.  4. RCA: Small caliber nondominant vessel.  Normal.  5.  Normal left ventricular size.  Mildly reduced systolic function.  Mild anterolateral hypokinesis.  Severe mid to distal inferior wall hypokinesis  7.  PCI of the proximal mid LAD with a 3.5 x 28 mm Xience CRI drug-eluting stent     Recommendations dual antiplatelet therapy for 1 year     Echo 3/2019:  Interpretation Summary     · Estimated EF = 61%.  · Left ventricular systolic function is normal.  · The following left ventricular wall segments are mildly  hypokinetic: apical septal, apex hypokinetic and mid anteroseptal.       The following portions of the patient's history were reviewed and updated as appropriate: allergies, current medications, past family history, past medical history, past social history, past surgical history and problem list.    Past Medical History:   Diagnosis Date   • Abnormal CT scan; splenic flexure thickening 6/16/2016   • Kidney stone    • Precordial pain    • Prostate disorder    • Shortness of breath        Past Surgical History:   Procedure Laterality Date   • CARDIAC CATHETERIZATION  03/26/2019    Coronary Angiography   • CARDIAC CATHETERIZATION  03/26/2019    Left Ventriculography   • CARDIAC CATHETERIZATION  03/26/2019    Left Heart Cath   • CARDIAC CATHETERIZATION  03/26/2019    Stent PRASANNA Coronary   • CARDIAC CATHETERIZATION N/A 3/26/2019    Procedure: Left Heart Cath;  Surgeon: Sachin Barrera MD;  Location:  [facility];  Service: Cardiology   • CARDIAC CATHETERIZATION N/A 3/26/2019    Procedure: Left ventriculography;  Surgeon: Sachin Barrera MD;  Location:  [facility];  Service: Cardiology   • CARDIAC CATHETERIZATION N/A 3/26/2019    Procedure: Coronary angiography;  Surgeon: Sachin Barrera MD;  Location:  [facility];  Service: Cardiology   • CARDIAC CATHETERIZATION N/A 3/26/2019    Procedure: Stent PRASANNA coronary;  Surgeon: Sachin Barrera MD;  Location:  [facility];  Service: Cardiology   • COLONOSCOPY  11/2017    second       Social History     Socioeconomic History   • Marital status:      Spouse name: Tracey   • Number of children: 3   • Years of education: Not on file   • Highest education level: Not on file   Occupational History   • Occupation: [employer]     Comment:    Social Needs   • Financial resource strain: Not hard at all   • Food insecurity:     Worry: Never true     Inability: Never  true   • Transportation needs:     Medical: No     Non-medical: No   Tobacco Use   • Smoking status: Never Smoker   • Smokeless tobacco: Never Used   Substance and Sexual Activity   • Alcohol use: Yes     Alcohol/week: 0.6 oz     Types: 1 Standard drinks or equivalent per week     Frequency: Monthly or less     Drinks per session: 1 or 2     Binge frequency: Never     Comment: 3/month   • Drug use: No   • Sexual activity: Yes     Partners: Female     Birth control/protection: Other   Lifestyle   • Physical activity:     Days per week: 6 days     Minutes per session: 40 min   • Stress: Only a little   Relationships   • Social connections:     Talks on phone: More than three times a week     Gets together: Once a week     Attends Restorationist service: More than 4 times per year     Active member of club or organization: Yes     Attends meetings of clubs or organizations: More than 4 times per year     Relationship status:    Social History Narrative    Anniversary 1976     82 Coby, 85 Ernesto, 9/3/86 Lizbeth    5 Grandchildren       Review of Systems   Constitution: Negative for chills, decreased appetite, fever and night sweats.   HENT: Negative for ear discharge, ear pain, hearing loss, nosebleeds and sore throat.    Eyes: Negative for blurred vision, double vision and pain.   Cardiovascular: Negative for cyanosis.   Respiratory: Negative for hemoptysis and sputum production.    Endocrine: Negative for cold intolerance and heat intolerance.   Hematologic/Lymphatic: Negative for adenopathy.   Skin: Negative for dry skin, itching, nail changes, rash and suspicious lesions.   Musculoskeletal: Negative for arthritis, gout, muscle cramps, muscle weakness, myalgias and neck pain.   Gastrointestinal: Negative for anorexia, bowel incontinence, constipation, diarrhea, dysphagia, hematemesis and jaundice.   Genitourinary: Negative for bladder incontinence, dysuria, flank pain, frequency, hematuria and  "nocturia.   Neurological: Negative for focal weakness, numbness, paresthesias and seizures.   Psychiatric/Behavioral: Negative for altered mental status, hallucinations, hypervigilance, suicidal ideas and thoughts of violence.   Allergic/Immunologic: Negative for persistent infections.       Procedures       Objective:     Vitals:    04/04/19 1234   BP: 124/70   Pulse: 63   Weight: 81.6 kg (180 lb)   Height: 172.7 cm (68\")         Physical Exam   Constitutional: He is oriented to person, place, and time. He appears well-developed and well-nourished. No distress.   HENT:   Head: Normocephalic and atraumatic.   Nose: Nose normal.   Mouth/Throat: Oropharynx is clear and moist.   Eyes: Conjunctivae and EOM are normal. Pupils are equal, round, and reactive to light. Right eye exhibits no discharge. Left eye exhibits no discharge.   Neck: Normal range of motion. Neck supple. No tracheal deviation present. No thyromegaly present.   Cardiovascular: Normal rate, regular rhythm, S1 normal, S2 normal, normal heart sounds and normal pulses. Exam reveals no S3.   Pulmonary/Chest: Effort normal and breath sounds normal. No stridor. No respiratory distress. He exhibits no tenderness.   Abdominal: Soft. Bowel sounds are normal. He exhibits no distension and no mass. There is no tenderness. There is no rebound and no guarding.   Musculoskeletal: Normal range of motion. He exhibits no tenderness or deformity.   Lymphadenopathy:     He has no cervical adenopathy.   Neurological: He is alert and oriented to person, place, and time. He has normal reflexes.   Skin: Skin is warm and dry. No rash noted. He is not diaphoretic. No erythema.   Psychiatric: He has a normal mood and affect. Thought content normal.       Lab Review:             Performed        Assessment:          Diagnosis Plan   1. Coronary artery disease involving native coronary artery of native heart without angina pectoris     2. Atherosclerosis of both carotid arteries   "          Plan:       1. Coronary Artery Disease  Assessment  • The patient has no angina    Plan  • Lifestyle modifications discussed include adhering to a heart healthy diet, avoidance of tobacco products, maintenance of a healthy weight, medication compliance, regular exercise and regular monitoring of cholesterol and blood pressure  • This patient is doing well.  Cardiac rehab did meet with him in the hospital, but he is already so active, does not smoke, and has excellent lipids prior to hospitalization that he will not proceed with cardiac rehab.    Subjective - Objective  • There has been a previous stent procedure using PRASANNA  • Current antiplatelet therapy includes aspirin 81 mg and ticagrelor 90 mg  • The patient qualifies for cardiac rehabilitation, and has been referred to cardiac rehab        Thank you very much for allowing us to participate in the care of this pleasant patient.  Please don't hesitate to call if I can be of assistance in any way.      Current Outpatient Medications:   •  aspirin 81 MG tablet, Take 81 mg by mouth daily., Disp: , Rfl:   •  atorvastatin (LIPITOR) 40 MG tablet, Take 1 tablet by mouth Every Night., Disp: 30 tablet, Rfl: 11  •  ticagrelor (BRILINTA) 90 MG tablet tablet, Take 1 tablet by mouth 2 (Two) Times a Day., Disp: 60 tablet, Rfl: 11

## 2019-04-22 ENCOUNTER — OFFICE VISIT (OUTPATIENT)
Dept: FAMILY MEDICINE CLINIC | Facility: CLINIC | Age: 69
End: 2019-04-22

## 2019-04-22 VITALS
TEMPERATURE: 98.3 F | DIASTOLIC BLOOD PRESSURE: 71 MMHG | WEIGHT: 177 LBS | BODY MASS INDEX: 26.83 KG/M2 | SYSTOLIC BLOOD PRESSURE: 126 MMHG | HEART RATE: 62 BPM | RESPIRATION RATE: 16 BRPM | HEIGHT: 68 IN

## 2019-04-22 DIAGNOSIS — E78.6 LOW HDL (UNDER 40): ICD-10-CM

## 2019-04-22 DIAGNOSIS — Z95.5 PRESENCE OF DRUG COATED STENT IN LAD CORONARY ARTERY: ICD-10-CM

## 2019-04-22 DIAGNOSIS — I25.10 CORONARY ARTERY DISEASE INVOLVING NATIVE CORONARY ARTERY OF NATIVE HEART WITHOUT ANGINA PECTORIS: Chronic | ICD-10-CM

## 2019-04-22 DIAGNOSIS — Z09 HOSPITAL DISCHARGE FOLLOW-UP: Primary | ICD-10-CM

## 2019-04-22 PROBLEM — R07.2 PRECORDIAL PAIN: Status: RESOLVED | Noted: 2019-03-26 | Resolved: 2019-04-22

## 2019-04-22 PROCEDURE — 99214 OFFICE O/P EST MOD 30 MIN: CPT | Performed by: FAMILY MEDICINE

## 2019-04-22 NOTE — ASSESSMENT & PLAN NOTE
Coronary artery disease is newly identified.  Continue current treatment regimen.  Cardiac status will be reassessed in 3 months. Will check labs prior to this visit.

## 2019-04-22 NOTE — ASSESSMENT & PLAN NOTE
New diagnosis for this provider. Continue dual antiplatelet therapy for one year as per cardiology.

## 2019-04-22 NOTE — PROGRESS NOTES
Chief Complaint   Patient presents with   • Other     Hospital Follow Up       Subjective   He is here today to follow-up on recent hospital stay.  He went in overnight in March and was found to have complete LAD obstruction and non-STEMI myocardial infarction.  He had drug-eluting stent placed with good results.  He continues with follow-up as per cardiology instruction.  He is back to working and gradually increasing his activity.  He has noticed much improved exercise tolerance and no further chest discomfort.  Cardiology placed him on dual anti-platelet therapy for the next 12 months.  He was also placed on a atorvastatin.  Most of his cholesterol numbers were satisfactory except for HDL cholesterol chronically in the low 30s.  Discharge summary note and cardiac cath procedural note and echocardiogram have been reviewed during today's visit.  I have reviewed and updated his medications, medical history and problem list during today's office visit.     Patient Care Team:  Jama Moses MD as PCP - General (Family Medicine)  Alfie Hooks MD as Consulting Physician (General Surgery)  Neri Lin MD as Consulting Physician (Ophthalmology)  Jama Tobin (Dental General Practice)  Vicente Schmitz III, MD as Consulting Physician (Cardiology)    Social History     Tobacco Use   • Smoking status: Never Smoker   • Smokeless tobacco: Never Used   Substance Use Topics   • Alcohol use: Yes     Alcohol/week: 0.6 oz     Types: 1 Standard drinks or equivalent per week     Frequency: Monthly or less     Drinks per session: 1 or 2     Binge frequency: Never     Comment: 3/month   Data reviewed from Hospital DC note:   Interventionalist: Sachin Barrera M.D., F.A.C.C.     Procedure performed:  1.  Coronary angiography  2.  Left heart catheterization  3.  Left ventricular angiography  4.  PCI to the proximal to mid-LAD        Indication:  Unstable angina     Referring: Vicente Schmitz     Procedure report:  Informed  consent was obtained and the patient was brought to the cardiac catheterization lab for coronary angiography, left heart catheterization, and left ventricular angiography.  The right wrist was prepped and draped in a sterile fashion.  Lidocaine was infused in the subcutaneous tissue for anesthesia.  Access was obtained to the right radial artery with a 20-gauge radial artery needle.  A 5/6 Tristanian 11 cm slender sheath was then advanced into the right radial artery without difficulty.  A 5 Tristanian JL 3.5 catheter was used for left coronary angiography.  A 5 Tristanian JR5 catheter was used for right coronary angiography.  A 5 Tristanian angled pigtail catheter was used to cross the aortic valve, measuring left ventricular pressures, perform left ventricular angiography, and perform pullback across the aortic valve measurements of the pressure gradient.  10 mL/s for a total 20 mL of Isovue 370 was used to perform left ventricular angiography in a CHOE view.  At case completion the pigtail catheter was removed over a 0.035 inch J-wire without difficulty.  The right radial artery sheath was removed without difficulty and a comfort band was placed over the access site with excellent hemostasis.         Procedure findings:  Left main: Large caliber vessel that bifurcates to an LAD and circumflex.  Normal  LAD: Medium caliber vessel that is acutely occluded in the midsegment.  Left circumflex: Large caliber, dominant vessel gives rise to a medium caliber OM1 and small caliber left PDA branch.  RCA: Small caliber nondominant vessel that is normal.     Normal left ventricular size.  Mildly reduced systolic function.  Distal inferior wall severe hypokinesis.  Mild anterolateral hypokinesis.     Percutaneous intervention report: Unfractionated heparin was used for anticoagulation with therapeutic ACT.  A 6 Tristanian XB 3.5 guide catheter was used to engage left main.  A 0.014 inch run through guidewire was used to cross the LAD occlusion  "and seated distally.  A 3.5 x 15 mm trek balloon was used to predilate the proximal to mid LAD.  A 3.5 x 28 mm Xience Erin drug-eluting stent was then deployed across the proximal and mid LAD stenosis at 14 gabby.  Completion angiography showed SHILOH-3 flow and no complications.  Wire and balloon were removed.     Hemodynamics:   LV:103/4  AO: 103/62        PCI CORONARY SEGMENT: Mid LAD  PRE-STENOSIS:100  POST-STENOSIS: 0%  LESION TYPE: c  SHILOH FLOW PRE/POST: 0/3  CULPRIT LESION: Yes     Estimated blood loss: Minimal  Complications: None     Conclusions:   1. Left main: Normal  2. LAD: Acutely occluded in the midsegment  3. LCX: Dominant vessel.  Normal.  4. RCA: Small caliber nondominant vessel.  Normal.  5.  Normal left ventricular size.  Mildly reduced systolic function.  Mild anterolateral hypokinesis.  Severe mid to distal inferior wall hypokinesis  7.  PCI of the proximal mid LAD with a 3.5 x 28 mm Xience CRI drug-eluting stent     Recommendations dual antiplatelet therapy for 1 year    Review of Systems   Respiratory: Negative for shortness of breath.    Cardiovascular: Negative for chest pain.   Skin:        Bruise left big toe under toenail   Neurological: Negative for weakness.       Objective     /71   Pulse 62   Temp 98.3 °F (36.8 °C) (Oral)   Resp 16   Ht 172.7 cm (68\")   Wt 80.3 kg (177 lb)   BMI 26.91 kg/m²     Body mass index is 26.91 kg/m².    Physical Exam   Constitutional: He is oriented to person, place, and time. He appears well-developed. No distress.   Eyes: Conjunctivae and lids are normal.   Neck: Carotid bruit is not present.   Cardiovascular: Normal rate, regular rhythm and normal heart sounds.   Pulmonary/Chest: Effort normal and breath sounds normal.   Neurological: He is alert and oriented to person, place, and time.   Skin: Skin is warm and dry.   Psychiatric: He has a normal mood and affect. His behavior is normal.   Vitals reviewed.       Data Reviewed:             Lab " Results   Component Value Date    BUN 13 03/27/2019    CREATININE 0.87 03/27/2019    EGFRIFNONA 88 03/27/2019     03/27/2019    K 4.0 03/27/2019     03/27/2019    CO2 23.8 03/27/2019    CALCIUM 8.4 (L) 03/27/2019    ALBUMIN 4.00 03/26/2019    BILITOT 0.3 03/26/2019    ALKPHOS 65 03/26/2019    AST 21 03/26/2019    ALT 21 03/26/2019    TRIG 109 03/27/2019    HDL 30 (L) 03/27/2019    VLDL 21.8 03/27/2019    LDL 70 03/27/2019    LDLHDL 2.34 03/27/2019    HGBA1C 5.20 03/27/2019    WBC 6.87 03/27/2019    RBC 4.55 03/27/2019    HCT 42.2 03/27/2019    MCV 92.7 03/27/2019    MCH 31.2 03/27/2019    MCHC 33.6 03/27/2019    RDW 12.5 03/27/2019    PSA 0.629 10/25/2017          Assessment/Plan     Diagnoses and all orders for this visit:    1. Hospital discharge follow-up (Primary)    2. Coronary artery disease involving native coronary artery of native heart without angina pectoris  Assessment & Plan:  Coronary artery disease is newly identified.  Continue current treatment regimen.  Cardiac status will be reassessed in 3 months. Will check labs prior to this visit.    Orders:  -     NMR LipoProfile; Future  -     Comprehensive Metabolic Panel; Future  -     CBC & Differential; Future  -     CK; Future    3. Low HDL (under 40)  Assessment & Plan:  Continue medications and exercise as per cardiology.     Orders:  -     NMR LipoProfile; Future  -     CK; Future    4. Presence of drug coated stent in LAD coronary artery  Assessment & Plan:  New diagnosis for this provider. Continue dual antiplatelet therapy for one year as per cardiology.         Return in about 3 months (around 7/22/2019) for Recheck.

## 2019-04-24 ENCOUNTER — TELEPHONE (OUTPATIENT)
Dept: CARDIOLOGY | Facility: CLINIC | Age: 69
End: 2019-04-24

## 2019-04-24 NOTE — TELEPHONE ENCOUNTER
I spoke with wife   They have already used the discount card and $45 a month is too expensive for pt.  I have placed 2 months worth of samples at the  for pt to .

## 2019-04-24 NOTE — TELEPHONE ENCOUNTER
Pt is on Brilinta 90 mg BID  He called wanting to know if there was other options due to the cost of Brilinta    Please Advise  Thank You :)

## 2019-04-24 NOTE — TELEPHONE ENCOUNTER
Mary- could we check and see if there is anything we could do to help him with the cost of the ticagrelor? That was what Dr Barrera had ordered for him

## 2019-06-21 ENCOUNTER — RESULTS ENCOUNTER (OUTPATIENT)
Dept: FAMILY MEDICINE CLINIC | Facility: CLINIC | Age: 69
End: 2019-06-21

## 2019-06-21 DIAGNOSIS — E78.6 LOW HDL (UNDER 40): ICD-10-CM

## 2019-06-21 DIAGNOSIS — I25.10 CORONARY ARTERY DISEASE INVOLVING NATIVE CORONARY ARTERY OF NATIVE HEART WITHOUT ANGINA PECTORIS: Chronic | ICD-10-CM

## 2019-06-25 LAB
ALBUMIN SERPL-MCNC: 3.9 G/DL (ref 3.5–5.2)
ALBUMIN/GLOB SERPL: 1.4 G/DL
ALP SERPL-CCNC: 69 U/L (ref 39–117)
ALT SERPL-CCNC: 20 U/L (ref 1–41)
AST SERPL-CCNC: 20 U/L (ref 1–40)
BASOPHILS # BLD AUTO: 0.03 10*3/MM3 (ref 0–0.2)
BASOPHILS NFR BLD AUTO: 0.8 % (ref 0–1.5)
BILIRUB SERPL-MCNC: 0.5 MG/DL (ref 0.2–1.2)
BUN SERPL-MCNC: 12 MG/DL (ref 8–23)
BUN/CREAT SERPL: 12.9 (ref 7–25)
CALCIUM SERPL-MCNC: 9 MG/DL (ref 8.6–10.5)
CHLORIDE SERPL-SCNC: 108 MMOL/L (ref 98–107)
CHOLEST SERPL-MCNC: 74 MG/DL (ref 100–199)
CK SERPL-CCNC: 121 U/L (ref 20–200)
CO2 SERPL-SCNC: 25.3 MMOL/L (ref 22–29)
CREAT SERPL-MCNC: 0.93 MG/DL (ref 0.76–1.27)
EOSINOPHIL # BLD AUTO: 0.18 10*3/MM3 (ref 0–0.4)
EOSINOPHIL NFR BLD AUTO: 4.5 % (ref 0.3–6.2)
ERYTHROCYTE [DISTWIDTH] IN BLOOD BY AUTOMATED COUNT: 13.2 % (ref 12.3–15.4)
GLOBULIN SER CALC-MCNC: 2.7 GM/DL
GLUCOSE SERPL-MCNC: 105 MG/DL (ref 65–99)
HCT VFR BLD AUTO: 43.6 % (ref 37.5–51)
HDL SERPL-SCNC: 26.4 UMOL/L
HDLC SERPL-MCNC: 39 MG/DL
HGB BLD-MCNC: 14.4 G/DL (ref 13–17.7)
IMM GRANULOCYTES # BLD AUTO: 0.01 10*3/MM3 (ref 0–0.05)
IMM GRANULOCYTES NFR BLD AUTO: 0.3 % (ref 0–0.5)
LDL SERPL QN: ABNORMAL NM
LDL SERPL-SCNC: <300 NMOL/L
LDL SMALL SERPL-SCNC: 112 NMOL/L
LDLC SERPL CALC-MCNC: 26 MG/DL (ref 0–99)
LYMPHOCYTES # BLD AUTO: 0.99 10*3/MM3 (ref 0.7–3.1)
LYMPHOCYTES NFR BLD AUTO: 24.8 % (ref 19.6–45.3)
MCH RBC QN AUTO: 31.6 PG (ref 26.6–33)
MCHC RBC AUTO-ENTMCNC: 33 G/DL (ref 31.5–35.7)
MCV RBC AUTO: 95.6 FL (ref 79–97)
MONOCYTES # BLD AUTO: 0.52 10*3/MM3 (ref 0.1–0.9)
MONOCYTES NFR BLD AUTO: 13 % (ref 5–12)
NEUTROPHILS # BLD AUTO: 2.27 10*3/MM3 (ref 1.7–7)
NEUTROPHILS NFR BLD AUTO: 56.6 % (ref 42.7–76)
NRBC BLD AUTO-RTO: 0 /100 WBC (ref 0–0.2)
PLATELET # BLD AUTO: 218 10*3/MM3 (ref 140–450)
POTASSIUM SERPL-SCNC: 5.2 MMOL/L (ref 3.5–5.2)
PROT SERPL-MCNC: 6.6 G/DL (ref 6–8.5)
RBC # BLD AUTO: 4.56 10*6/MM3 (ref 4.14–5.8)
SODIUM SERPL-SCNC: 144 MMOL/L (ref 136–145)
TRIGL SERPL-MCNC: 43 MG/DL (ref 0–149)
WBC # BLD AUTO: 4 10*3/MM3 (ref 3.4–10.8)

## 2019-08-19 ENCOUNTER — OFFICE VISIT (OUTPATIENT)
Dept: FAMILY MEDICINE CLINIC | Facility: CLINIC | Age: 69
End: 2019-08-19

## 2019-08-19 VITALS
OXYGEN SATURATION: 97 % | HEART RATE: 64 BPM | RESPIRATION RATE: 16 BRPM | DIASTOLIC BLOOD PRESSURE: 72 MMHG | HEIGHT: 68 IN | SYSTOLIC BLOOD PRESSURE: 117 MMHG | WEIGHT: 173 LBS | BODY MASS INDEX: 26.22 KG/M2

## 2019-08-19 DIAGNOSIS — R35.1 NOCTURIA: Primary | ICD-10-CM

## 2019-08-19 DIAGNOSIS — R73.9 ELEVATED BLOOD SUGAR LEVEL: ICD-10-CM

## 2019-08-19 DIAGNOSIS — I65.23 ATHEROSCLEROSIS OF BOTH CAROTID ARTERIES: ICD-10-CM

## 2019-08-19 DIAGNOSIS — E78.6 LOW HDL (UNDER 40): ICD-10-CM

## 2019-08-19 DIAGNOSIS — L57.0 ACTINIC KERATOSIS OF SCALP: ICD-10-CM

## 2019-08-19 DIAGNOSIS — I25.10 CORONARY ARTERY DISEASE INVOLVING NATIVE CORONARY ARTERY OF NATIVE HEART WITHOUT ANGINA PECTORIS: Chronic | ICD-10-CM

## 2019-08-19 PROCEDURE — 99213 OFFICE O/P EST LOW 20 MIN: CPT | Performed by: FAMILY MEDICINE

## 2019-08-19 NOTE — PROGRESS NOTES
"Chief Complaint:   Subjective    Rooming Tab(CC,VS,Pt Hx,Fall Screen)   Chief Complaint   Patient presents with   • Labs Only   • head     top of head redness         History of Present Illness   Jama Birch is a 69 y.o. male who presents to the office today to review labs.  Complete blood cell count reviewed and only shows mild abnormality of increased monocyte percentage.  Total white blood cell count is normal.  Blood sugar is mildly elevated to 105.  Kidney function normal.  Body electrolytes normal except for mild elevation of chloride.  Proteins normal.  Liver function tests normal.  NMR LipoProfile acceptable.  Mild decreased HDL cholesterol.  LDL cholesterol is to goal.  Patient remains on dual antiplatelet therapy.  He is also on statin therapy.  Patient has noticed some redness and rough spots on his scalp and would like those checked out.    I have reviewed and updated his medications, medical history and problem list during today's office visit.     Problem List Tab  Medications Tab  Synopsis Tab  Chart Review Tab  Care Everywhere Tab  Immunizations Tab  Patient History Tab  Social History     Tobacco Use   • Smoking status: Never Smoker   • Smokeless tobacco: Never Used   Substance Use Topics   • Alcohol use: Yes     Alcohol/week: 0.6 oz     Types: 1 Standard drinks or equivalent per week     Frequency: Monthly or less     Drinks per session: 1 or 2     Binge frequency: Never     Comment: 3/month       Review of Systems   Constitutional: Negative for fatigue.   Respiratory: Negative for cough and shortness of breath.    Cardiovascular: Negative for chest pain.   Skin:        See HPI         Physical Examination:   Objective   Rooming Tab(CC,VS,Pt Hx,Fall Screen)  /72   Pulse 64   Resp 16   Ht 172.7 cm (67.99\")   Wt 78.5 kg (173 lb)   SpO2 97%   BMI 26.31 kg/m²     Body mass index is 26.31 kg/m².    Physical Exam   Constitutional: He is oriented to person, place, and time. No distress. "   Cardiovascular: Normal rate and normal heart sounds.   Pulmonary/Chest: Effort normal and breath sounds normal.   Neurological: He is alert and oriented to person, place, and time.   Skin: He is not diaphoretic.   Generalized erythema scalp.  Multiple crusty lesions consistent with actinic keratoses.   Psychiatric: He has a normal mood and affect. His speech is normal. He is attentive.   Vitals reviewed.       Data Reviewed:              Lab Results   Component Value Date     (H) 06/24/2019    BUN 12 06/24/2019    CREATININE 0.93 06/24/2019    EGFRIFNONA 81 06/24/2019    EGFRIFAFRI 98 06/24/2019     06/24/2019    K 5.2 06/24/2019     (H) 06/24/2019    CALCIUM 9.0 06/24/2019    ALBUMIN 3.90 06/24/2019    BILITOT 0.5 06/24/2019    ALKPHOS 69 06/24/2019    AST 20 06/24/2019    ALT 20 06/24/2019    CHLPL 74 (L) 06/24/2019    TRIG 43 06/24/2019    CKTOTAL 121 06/24/2019    WBC 4.00 06/24/2019    RBC 4.56 06/24/2019    HCT 43.6 06/24/2019    MCV 95.6 06/24/2019    MCH 31.6 06/24/2019          Assessment/Plan:   Assessment/Plan   Order Review Tab  Health Maintenance Tab  Patient Plan/Order Tab  Diagnoses and all orders for this visit:    1. Nocturia (Primary)  -     PSA DIAGNOSTIC; Future    2. Actinic keratosis of scalp  Assessment & Plan:  Referral to dermatology    Orders:  -     Ambulatory Referral to Dermatology    3. Coronary artery disease involving native coronary artery of native heart without angina pectoris  Assessment & Plan:  Coronary artery disease is unchanged.  Continue current treatment regimen.  Cardiac status will be reassessed in 1 year.    Orders:  -     Comprehensive Metabolic Panel; Future  -     CBC & Differential; Future  -     NMR LipoProfile; Future    4. Atherosclerosis of both carotid arteries  -     Comprehensive Metabolic Panel; Future  -     CBC & Differential; Future  -     NMR LipoProfile; Future    5. Low HDL (under 40)  -     NMR LipoProfile; Future    6. Elevated  blood sugar level  -     Comprehensive Metabolic Panel; Future  -     Hemoglobin A1c; Future     Follow up:   Wrapup Tab  Return in about 1 year (around 8/19/2020) for Adult Wellness Visit, 30 minute visit.

## 2019-08-19 NOTE — ASSESSMENT & PLAN NOTE
Coronary artery disease is unchanged.  Continue current treatment regimen.  Cardiac status will be reassessed in 1 year.

## 2019-10-23 ENCOUNTER — TELEPHONE (OUTPATIENT)
Dept: CARDIOLOGY | Facility: CLINIC | Age: 69
End: 2019-10-23

## 2019-10-23 ENCOUNTER — HOSPITAL ENCOUNTER (EMERGENCY)
Facility: HOSPITAL | Age: 69
Discharge: HOME | End: 2019-10-23
Attending: EMERGENCY MEDICINE | Admitting: EMERGENCY MEDICINE

## 2019-10-23 ENCOUNTER — APPOINTMENT (OUTPATIENT)
Dept: GENERAL RADIOLOGY | Facility: HOSPITAL | Age: 69
End: 2019-10-23

## 2019-10-23 VITALS
OXYGEN SATURATION: 97 % | TEMPERATURE: 96.4 F | BODY MASS INDEX: 26.3 KG/M2 | DIASTOLIC BLOOD PRESSURE: 81 MMHG | SYSTOLIC BLOOD PRESSURE: 135 MMHG | HEART RATE: 77 BPM | HEIGHT: 68 IN | RESPIRATION RATE: 18 BRPM

## 2019-10-23 DIAGNOSIS — R06.09 DYSPNEA ON EXERTION: Primary | ICD-10-CM

## 2019-10-23 LAB
ALBUMIN SERPL-MCNC: 4.5 G/DL (ref 3.5–5.2)
ALBUMIN/GLOB SERPL: 1.6 G/DL
ALP SERPL-CCNC: 66 U/L (ref 39–117)
ALT SERPL W P-5'-P-CCNC: 33 U/L (ref 1–41)
ANION GAP SERPL CALCULATED.3IONS-SCNC: 11.2 MMOL/L (ref 5–15)
AST SERPL-CCNC: 29 U/L (ref 1–40)
BILIRUB SERPL-MCNC: 0.6 MG/DL (ref 0.2–1.2)
BUN BLD-MCNC: 15 MG/DL (ref 8–23)
BUN/CREAT SERPL: 16.1 (ref 7–25)
CALCIUM SPEC-SCNC: 9 MG/DL (ref 8.6–10.5)
CHLORIDE SERPL-SCNC: 104 MMOL/L (ref 98–107)
CO2 SERPL-SCNC: 26.8 MMOL/L (ref 22–29)
CREAT BLD-MCNC: 0.93 MG/DL (ref 0.76–1.27)
DEPRECATED RDW RBC AUTO: 42.3 FL (ref 37–54)
EOSINOPHIL # BLD MANUAL: 0.03 10*3/MM3 (ref 0–0.4)
EOSINOPHIL NFR BLD MANUAL: 1 % (ref 0.3–6.2)
ERYTHROCYTE [DISTWIDTH] IN BLOOD BY AUTOMATED COUNT: 12.4 % (ref 12.3–15.4)
GFR SERPL CREATININE-BSD FRML MDRD: 81 ML/MIN/1.73
GIANT PLATELETS: NORMAL
GLOBULIN UR ELPH-MCNC: 2.8 GM/DL
GLUCOSE BLD-MCNC: 105 MG/DL (ref 65–99)
HCT VFR BLD AUTO: 41.8 % (ref 37.5–51)
HGB BLD-MCNC: 14.4 G/DL (ref 13–17.7)
LYMPHOCYTES # BLD MANUAL: 0.94 10*3/MM3 (ref 0.7–3.1)
LYMPHOCYTES NFR BLD MANUAL: 10.2 % (ref 5–12)
LYMPHOCYTES NFR BLD MANUAL: 31.6 % (ref 19.6–45.3)
MCH RBC QN AUTO: 31.9 PG (ref 26.6–33)
MCHC RBC AUTO-ENTMCNC: 34.4 G/DL (ref 31.5–35.7)
MCV RBC AUTO: 92.5 FL (ref 79–97)
MONOCYTES # BLD AUTO: 0.3 10*3/MM3 (ref 0.1–0.9)
NEUTROPHILS # BLD AUTO: 1.7 10*3/MM3 (ref 1.7–7)
NEUTROPHILS NFR BLD MANUAL: 57.1 % (ref 42.7–76)
NT-PROBNP SERPL-MCNC: 58.9 PG/ML (ref 5–900)
PLATELET # BLD AUTO: 179 10*3/MM3 (ref 140–450)
PMV BLD AUTO: 8.8 FL (ref 6–12)
POTASSIUM BLD-SCNC: 4.6 MMOL/L (ref 3.5–5.2)
PROT SERPL-MCNC: 7.3 G/DL (ref 6–8.5)
RBC # BLD AUTO: 4.52 10*6/MM3 (ref 4.14–5.8)
RBC MORPH BLD: NORMAL
SODIUM BLD-SCNC: 142 MMOL/L (ref 136–145)
TROPONIN T SERPL-MCNC: <0.01 NG/ML (ref 0–0.03)
WBC MORPH BLD: NORMAL
WBC NRBC COR # BLD: 2.98 10*3/MM3 (ref 3.4–10.8)

## 2019-10-23 PROCEDURE — 80053 COMPREHEN METABOLIC PANEL: CPT | Performed by: EMERGENCY MEDICINE

## 2019-10-23 PROCEDURE — 99283 EMERGENCY DEPT VISIT LOW MDM: CPT

## 2019-10-23 PROCEDURE — 93005 ELECTROCARDIOGRAM TRACING: CPT | Performed by: EMERGENCY MEDICINE

## 2019-10-23 PROCEDURE — 71046 X-RAY EXAM CHEST 2 VIEWS: CPT

## 2019-10-23 PROCEDURE — 85025 COMPLETE CBC W/AUTO DIFF WBC: CPT | Performed by: EMERGENCY MEDICINE

## 2019-10-23 PROCEDURE — 85007 BL SMEAR W/DIFF WBC COUNT: CPT | Performed by: EMERGENCY MEDICINE

## 2019-10-23 PROCEDURE — 93010 ELECTROCARDIOGRAM REPORT: CPT | Performed by: INTERNAL MEDICINE

## 2019-10-23 PROCEDURE — 84484 ASSAY OF TROPONIN QUANT: CPT | Performed by: EMERGENCY MEDICINE

## 2019-10-23 PROCEDURE — 83880 ASSAY OF NATRIURETIC PEPTIDE: CPT | Performed by: EMERGENCY MEDICINE

## 2019-10-23 RX ORDER — SODIUM CHLORIDE 0.9 % (FLUSH) 0.9 %
10 SYRINGE (ML) INJECTION AS NEEDED
Status: DISCONTINUED | OUTPATIENT
Start: 2019-10-23 | End: 2019-10-23 | Stop reason: HOSPADM

## 2019-10-23 NOTE — ED PROVIDER NOTES
EMERGENCY DEPARTMENT ENCOUNTER    CHIEF COMPLAINT  Chief Complaint: SOA  History given by: patient  History limited by: nothing  Room Number: 14/14  PMD: Jama Moses MD      HPI:  Pt is a 69 y.o. male with history of precordial pain who presents to the ED via private vehicle complaining of worsening SOA starting a few weeks ago, that is aggravated upon exertion and improved upon rest. Also c/o fatigue, lightheadedness, and subjective fevers/chills. Denies CP, abd pain, N/V/D, or rash. Pt is scheduled with cardiology tomorrow for follow up.    MEDICAL RECORD REVIEW    Pt was seen and admitted here on 3/26/19 for NSTEMI.    PAST MEDICAL HISTORY  Active Ambulatory Problems     Diagnosis Date Noted   • Lipoma of torso 10/25/2017   • Nocturia 10/25/2017   • History of kidney stones 01/01/1993   • Atherosclerosis of both carotid arteries 03/25/2019   • Coronary artery disease involving native coronary artery of native heart without angina pectoris 04/04/2019   • Low HDL (under 40) 04/22/2019   • Presence of drug coated stent in LAD coronary artery 03/26/2019   • Actinic keratosis of scalp 08/19/2019     Resolved Ambulatory Problems     Diagnosis Date Noted   • Abnormal CT scan; splenic flexure thickening 06/16/2016   • Arm skin lesion, left 12/01/2016   • Dizziness 12/01/2016   • Precordial pain 03/26/2019     Past Medical History:   Diagnosis Date   • Abnormal CT scan; splenic flexure thickening 6/16/2016   • Coronary artery disease involving native coronary artery of native heart without angina pectoris 4/4/2019   • Kidney stone    • Precordial pain    • Prostate disorder    • Shortness of breath        PAST SURGICAL HISTORY  Past Surgical History:   Procedure Laterality Date   • CARDIAC CATHETERIZATION  03/26/2019    Coronary Angiography   • CARDIAC CATHETERIZATION  03/26/2019    Left Ventriculography   • CARDIAC CATHETERIZATION  03/26/2019    Left Heart Cath   • CARDIAC CATHETERIZATION  03/26/2019    Stent PRASANNA  Coronary   • CARDIAC CATHETERIZATION N/A 3/26/2019    Procedure: Left Heart Cath;  Surgeon: Sachin Barrera MD;  Location:  [facility];  Service: Cardiology   • CARDIAC CATHETERIZATION N/A 3/26/2019    Procedure: Left ventriculography;  Surgeon: Sachin Barrera MD;  Location:  [facility];  Service: Cardiology   • CARDIAC CATHETERIZATION N/A 3/26/2019    Procedure: Coronary angiography;  Surgeon: Sachin Barrera MD;  Location:  [facility];  Service: Cardiology   • CARDIAC CATHETERIZATION N/A 3/26/2019    Procedure: Stent PRASANNA coronary;  Surgeon: Sachin Barrera MD;  Location:  [facility];  Service: Cardiology   • COLONOSCOPY  2017    second       FAMILY HISTORY  Family History   Problem Relation Age of Onset   • Other Mother         brain tumor, meningioma   • Cancer Mother    • Lung cancer Father 48        heavy smoker   • Diabetes Maternal Aunt    • Diabetes Maternal Uncle    • Hypertension Maternal Uncle    • Heart disease Paternal Grandfather    • Heart attack Maternal Grandmother    • Heart attack Paternal Grandmother 68           • Parkinsonism Maternal Uncle    • Diabetes Maternal Aunt        SOCIAL HISTORY  Social History     Socioeconomic History   • Marital status:      Spouse name: Tracey   • Number of children: 3   • Years of education: Not on file   • Highest education level: Not on file   Occupational History   • Occupation: [employer]     Comment:    Social Needs   • Financial resource strain: Not hard at all   • Food insecurity:     Worry: Never true     Inability: Never true   • Transportation needs:     Medical: No     Non-medical: No   Tobacco Use   • Smoking status: Never Smoker   • Smokeless tobacco: Never Used   Substance and Sexual Activity   • Alcohol use: Yes     Alcohol/week: 0.6 oz     Types: 1 Standard drinks or equivalent per week     Frequency: Monthly or  less     Drinks per session: 1 or 2     Binge frequency: Never     Comment: 3/month   • Drug use: No   • Sexual activity: Yes     Partners: Female     Birth control/protection: Other   Lifestyle   • Physical activity:     Days per week: 6 days     Minutes per session: 40 min   • Stress: Only a little   Relationships   • Social connections:     Talks on phone: More than three times a week     Gets together: Once a week     Attends Scientologist service: More than 4 times per year     Active member of club or organization: Yes     Attends meetings of clubs or organizations: More than 4 times per year     Relationship status:    Social History Narrative    Anniversary 1976     82 Coby, 85 Ernesto, 9/3/86 Lizbeth    5 Grandchildren       ALLERGIES  Patient has no known allergies.    REVIEW OF SYSTEMS  Review of Systems   Constitutional: Positive for chills (subjective), fatigue and fever (subjective).   Respiratory: Positive for shortness of breath.    Neurological: Positive for light-headedness.     All systems reviewed and negative except for those discussed in HPI.    PHYSICAL EXAM  ED Triage Vitals [10/23/19 1222]   Temp Heart Rate Resp BP SpO2   96.4 °F (35.8 °C) 58 -- -- 98 %      Temp src Heart Rate Source Patient Position BP Location FiO2 (%)   Tympanic Monitor -- -- --       Physical Exam   Constitutional: He is oriented to person, place, and time. No distress.   HENT:   Head: Normocephalic and atraumatic.   Eyes: EOM are normal. Pupils are equal, round, and reactive to light.   Neck: Normal range of motion. Neck supple.   Cardiovascular: Normal rate, regular rhythm and normal heart sounds.   Pulmonary/Chest: Effort normal and breath sounds normal. No respiratory distress.   Abdominal: Soft. There is no tenderness. There is no rebound and no guarding.   Musculoskeletal: Normal range of motion. He exhibits no edema.   Neurological: He is alert and oriented to person, place, and time. He has normal  sensation and normal strength.   Skin: Skin is warm and dry.   Psychiatric: Mood and affect normal.   Nursing note and vitals reviewed.      LAB RESULTS  Lab Results (last 24 hours)     Procedure Component Value Units Date/Time    CBC & Differential [[id number]] Collected:  10/23/19 1258    Specimen:  Blood Updated:  10/23/19 1412    Narrative:       The following orders were created for panel order CBC & Differential.  Procedure                               Abnormality         Status                     ---------                               -----------         ------                     CBC Auto Differential[[id number]]        Abnormal            Final result                 Please view results for these tests on the individual orders.    Comprehensive Metabolic Panel [[id number]]  (Abnormal) Collected:  10/23/19 1258    Specimen:  Blood Updated:  10/23/19 1337     Glucose 105 mg/dL      BUN 15 mg/dL      Creatinine 0.93 mg/dL      Sodium 142 mmol/L      Potassium 4.6 mmol/L      Chloride 104 mmol/L      CO2 26.8 mmol/L      Calcium 9.0 mg/dL      Total Protein 7.3 g/dL      Albumin 4.50 g/dL      ALT (SGPT) 33 U/L      AST (SGOT) 29 U/L      Alkaline Phosphatase 66 U/L      Total Bilirubin 0.6 mg/dL      eGFR Non African Amer 81 mL/min/1.73      Globulin 2.8 gm/dL      A/G Ratio 1.6 g/dL      BUN/Creatinine Ratio 16.1     Anion Gap 11.2 mmol/L     Narrative:       GFR Normal >60  Chronic Kidney Disease <60  Kidney Failure <15    BNP [[id number]]  (Normal) Collected:  10/23/19 1258    Specimen:  Blood Updated:  10/23/19 1336     proBNP 58.9 pg/mL     Narrative:       Among patients with dyspnea, NT-proBNP is highly sensitive for the detection of acute congestive heart failure. In addition NT-proBNP of <300 pg/ml effectively rules out acute congestive heart failure with 99% negative predictive value.    Troponin [[id number]]  (Normal) Collected:  10/23/19 1258    Specimen:  Blood Updated:  10/23/19 1337     Troponin T  <0.010 ng/mL     Narrative:       Troponin T Reference Range:  <= 0.03 ng/mL-   Negative for AMI  >0.03 ng/mL-     Abnormal for myocardial necrosis.  Clinicians would have to utilize clinical acumen, EKG, Troponin and serial changes to determine if it is an Acute Myocardial Infarction or myocardial injury due to an underlying chronic condition.     CBC Auto Differential [[id number]]  (Abnormal) Collected:  10/23/19 1258    Specimen:  Blood Updated:  10/23/19 1412     WBC 2.98 10*3/mm3      RBC 4.52 10*6/mm3      Hemoglobin 14.4 g/dL      Hematocrit 41.8 %      MCV 92.5 fL      MCH 31.9 pg      MCHC 34.4 g/dL      RDW 12.4 %      RDW-SD 42.3 fl      MPV 8.8 fL      Platelets 179 10*3/mm3     Manual Differential [[id number]] Collected:  10/23/19 1258    Specimen:  Blood Updated:  10/23/19 1412     Neutrophil % 57.1 %      Lymphocyte % 31.6 %      Monocyte % 10.2 %      Eosinophil % 1.0 %      Neutrophils Absolute 1.70 10*3/mm3      Lymphocytes Absolute 0.94 10*3/mm3      Monocytes Absolute 0.30 10*3/mm3      Eosinophils Absolute 0.03 10*3/mm3      RBC Morphology Normal     WBC Morphology Normal     Giant Platelets Slight/1+          I ordered the above labs and reviewed the results.    RADIOLOGY  XR Chest 2 View   Final Result   No focal pulmonary consolidation. Pulmonary hyperinflation.   Follow-up as clinical indications persist.       This report was finalized on 10/23/2019 1:25 PM by Dr. Eleazar Beltran M.D.               I ordered the above noted radiological studies. Interpreted by radiologist. Reviewed by me in PACS. See dictation for official radiology interpretation.      PROGRESS AND CONSULTS    ED Course as of Oct 23 1455   Wed Oct 23, 2019   1231 Review of prior records in epic: He had chest pain in March and was found to have a stenotic LAD.  He had a catheterization and stent placed by Dr. Barrera.  [WC]   1244 EKG performed at 1238 and interpreted by me shows a normal sinus rhythm with a heart rate of  59 bpm.  The MD intervals and QRS complexes are normal.  There are nonspecific ST-T changes that are unchanged from 3/27/2019.  [WC]   1415 Here we have a patient with nonspecific symptoms for several weeks-he denies chest pain, but he does feel weak and short of breath with myocardial infarction in March of this year.  His catheterization at that time revealed one vessel disease that was successfully stented.  His EKG and troponin here are unremarkable.  His chest x-ray is unremarkable.  There is no evidence of congestive heart failure.  Think he is safe for outpatient follow-up with his cardiologist tomorrow.  [WC]      ED Course User Index  [WC] Soy Pardo MD       1242- Notified pt of negative EKG. Discussed w/ pt and family plan to obtain labs and imaging of chest, as well as start pt on IVF. Pt understands and agrees with the plan, all questions answered. Ordered labs and XR Chest for further evaluation. Also ordered IVF for sx management.    1435- Rechecked pt. Pt is resting comfortably. Notified pt of unremarkable work-up. Discussed the plan to discharge the pt home, he has an appointment with his cardiologist tomorrow at 1230 which I advised him to keep.  He is agreeable with this plan.    MEDICAL DECISION MAKING  Results were reviewed/discussed with the patient and they were also made aware of online access. Pt also made aware that some labs, such as cultures, will not be resulted during ER visit and follow up with PMD is necessary.          DIAGNOSIS  Final diagnoses:   Dyspnea on exertion       DISPOSITION  DISCHARGE    Patient discharged in stable condition.    Reviewed implications of results, diagnosis, meds, responsibility to follow up, warning signs and symptoms of possible worsening, potential complications and reasons to return to ER, including worsening sx.    Patient/Family voiced understanding of above instructions.    Discussed plan for discharge, as there is no emergent indication for  admission. Patient referred to primary care provider for BP management due to today's BP. Pt/family is agreeable and understands need for follow up and repeat testing.  Pt is aware that discharge does not mean that nothing is wrong but it indicates no emergency is present that requires admission and they must continue care with follow-up as given below or physician of their choice.     FOLLOW-UP  [facility]  [address] [address] [phone]  Schedule an appointment as soon as possible for a visit in 1 day  as scheduled    [facility] Emergency Department  [address] [address] [phone]    As needed, If symptoms worsen         Medication List      No changes were made to your prescriptions during this visit.           Latest Documented Vital Signs:  As of 2:55 PM  BP- 135/81 HR- 77 Temp- 96.4 °F (35.8 °C) (Tympanic) O2 sat- 97%    --  Documentation assistance provided by genet Hdez for Dr. Pardo.  Information recorded by the scribe was done at my direction and has been verified and validated by me.     Kiesha Hdez  10/23/19 1419       Soy Pardo MD  10/23/19 7280

## 2019-10-23 NOTE — ED NOTES
Pt reports shortness of breath for a couple weeks along with lightheadedness and weakness. Pt denies any chest pain, N&V. Pt has a history of MI in March of 2019.      Tracy Castro RN  10/23/19 7631

## 2019-10-23 NOTE — TELEPHONE ENCOUNTER
"10/23/19  8:56 AM  Jama Birch  1950  Home Phone [phone]   Mobile [phone]       Jama Birch is a patient of Dr Schmitz who was last seen in office 4/4/19 after having a cardiac cath and stent placement. Patient called into the office to report episodes of \"labored breathing,  like its just not the normal supply of oxygen\". Patient states that it has been intermittent for last couple of weeks but yesterday he became lightheaded and \"legs became wobbly\".Happens with and without exertion.   Denies chest pain/diaphoresis/nausea. Patient did not have a BP/HR to report.  Patient called PCP to try and get an appointment. He was not able to get scheduled anytime soon. He had recalled that he was told by cardiologist to report any type of labored breathing so he thought he should call.    Cardiac meds  Brillinta 90mg 2x a day  ASA 81mg daily  Lipitor 40mg daily    Next scheduled appt is 4/4/19 with Dr Schmitz    Please advise on how to proceed    Thank you  Beronica Berkowitz , RN  [facility] Triage Nurse    "

## 2019-10-24 ENCOUNTER — OFFICE VISIT (OUTPATIENT)
Dept: CARDIOLOGY | Facility: CLINIC | Age: 69
End: 2019-10-24

## 2019-10-24 VITALS
WEIGHT: 173 LBS | HEIGHT: 68 IN | BODY MASS INDEX: 26.22 KG/M2 | DIASTOLIC BLOOD PRESSURE: 70 MMHG | SYSTOLIC BLOOD PRESSURE: 128 MMHG | HEART RATE: 70 BPM

## 2019-10-24 DIAGNOSIS — R53.83 TIRED: ICD-10-CM

## 2019-10-24 DIAGNOSIS — Z95.5 PRESENCE OF DRUG COATED STENT IN LAD CORONARY ARTERY: ICD-10-CM

## 2019-10-24 DIAGNOSIS — I25.10 CORONARY ARTERY DISEASE INVOLVING NATIVE CORONARY ARTERY OF NATIVE HEART WITHOUT ANGINA PECTORIS: Chronic | ICD-10-CM

## 2019-10-24 DIAGNOSIS — R06.02 SHORTNESS OF BREATH: Primary | ICD-10-CM

## 2019-10-24 PROCEDURE — 99214 OFFICE O/P EST MOD 30 MIN: CPT | Performed by: NURSE PRACTITIONER

## 2019-10-24 PROCEDURE — 93000 ELECTROCARDIOGRAM COMPLETE: CPT | Performed by: NURSE PRACTITIONER

## 2019-10-24 RX ORDER — IMIQUIMOD 12.5 MG/.25G
CREAM TOPICAL 3 TIMES WEEKLY
COMMUNITY
End: 2020-03-16

## 2019-10-24 NOTE — PROGRESS NOTES
"  Date of Office Visit: 10/24/2019  Encounter Provider: ILDEFONSO Dennison  Place of Service: [facility] CARDIOLOGY  Patient Name: Jama Birch III  :1950  Primary Cardiologist: Dr. Latasha Jamison     Chief Complaint   Patient presents with   • Dizziness   • Shortness of Breath   :     Dear Dr. Moses,     HPI: Jama Birch III is a pleasant 69 y.o. male who presents today for evaluation of lightheadedness and shortness of breath. He is a new patient to me and his previous records have been reviewed.    He has a known history of coronary artery disease and drug-eluting stent placement to the LAD in 2019.    He contacted our office on 10/23/2019 with reports of \"labored breathing\", lightheadedness, and legs feeling wobbly.  He denied chest pain.  He was scheduled with me today.    In the meantime he decided to present to the [facility] emergency department.  His oxygen saturation was 98%.  I reviewed his blood work which include a CMP which was normal except for glucose of 105, proBNP normal at 58, troponin normal, hemoglobin and hematocrit normal, white blood cell low at 2.98, and chest x-ray showed pulmonary hyperinflation.  He denied chest pain and his EKG showed normal sinus rhythm with ST-T changes.  Unchanged from previous EKG.    He presents today for evaluation.  Orthostatics were completed and were negative.  He reports that he is not panting for air he just cannot get a full breath and has to take deeper breaths.  Sometimes when he takes a deep breath he feels lightheaded.  He feels that this is difference in his breathing is similar to before his stent was placed and he reports fatigue which also felt similar.  He denies chest pain, PND, orthopnea, cough, edema, palpitations, dizziness, or syncope.  He also said he recently started imiquimod a few weeks ago for treatment of squamous cell cancer on his scalp and he will be done with his treatment " on Monday.  He says his symptoms may have started with application of this cream.  He denies any shortness of breath previously on ticagrelor and he drinks 6 cups of coffee daily.  He is concerned that his blood pressure is a little bit higher for him today although it has been pretty consistent this year.    Past Medical History:   Diagnosis Date   • Abnormal CT scan; splenic flexure thickening 6/16/2016   • Atherosclerosis of both carotid arteries    • Coronary artery disease involving native coronary artery of native heart without angina pectoris 4/4/2019   • Kidney stone    • Precordial pain    • Prostate disorder    • Shortness of breath        Past Surgical History:   Procedure Laterality Date   • CARDIAC CATHETERIZATION  03/26/2019    Coronary Angiography   • CARDIAC CATHETERIZATION  03/26/2019    Left Ventriculography   • CARDIAC CATHETERIZATION  03/26/2019    Left Heart Cath   • CARDIAC CATHETERIZATION  03/26/2019    Stent PRASANNA Coronary   • CARDIAC CATHETERIZATION N/A 3/26/2019    Procedure: Left Heart Cath;  Surgeon: Sachin Barrera MD;  [facility];  Service: Cardiology   • CARDIAC CATHETERIZATION N/A 3/26/2019    Procedure: Left ventriculography;  Surgeon: Sachin Barrera MD;  Location:  [facility] CATH INVASIVE LOCATION;  Service: Cardiology   • CARDIAC CATHETERIZATION N/A 3/26/2019    Procedure: Coronary angiography;  Surgeon: Sachin Barrera MD;  Location:  [facility] CATH INVASIVE LOCATION;  Service: Cardiology   • CARDIAC CATHETERIZATION N/A 3/26/2019    Procedure: Stent PRASANNA coronary;  Surgeon: Sachin Barrera MD;  [facility];  Service: Cardiology   • COLONOSCOPY  11/2017    second       Social History     Socioeconomic History   • Marital status:      Spouse name: Tracey   • Number of children: 3   • Years of education: Not on file   • Highest education level: Not on file   Occupational History   • Occupation: [employer]     Comment:    Social Needs   • Financial resource strain: Not hard at all   • Food insecurity:     Worry: Never true     Inability: Never true   • Transportation needs:     Medical: No     Non-medical: No   Tobacco Use   • Smoking status: Never Smoker   • Smokeless tobacco: Never Used   Substance and Sexual Activity   • Alcohol use: Yes     Alcohol/week: 0.6 oz     Types: 1 Standard drinks or equivalent per week     Frequency: Monthly or less     Drinks per session: 1 or 2     Binge frequency: Never     Comment: Caffeine use   • Drug use: No   • Sexual activity: Yes     Partners: Female     Birth control/protection: Other   Lifestyle   • Physical activity:     Days per week: 6 days     Minutes per session: 40 min   • Stress: Only a little   Relationships   • Social connections:     Talks on phone: More than three times a week     Gets together: Once a week     Attends Pentec[employer]al service: More than 4 times per year     Active member of club or organization: Yes     Attends meetings of clubs or organizations: More than 4 times per year     Relationship status:    Social History Narrative    Anniversary 1976     81 Coby, 84 Ernesto, 9/3/86 Lizbeth    5 Grandchildren       Family History   Problem Relation Age of Onset   • Other Mother         brain tumor, meningioma   • Cancer Mother    • Lung cancer Father 48        heavy smoker   • Diabetes Maternal Aunt    • Diabetes Maternal Uncle    • Hypertension Maternal Uncle    • Heart disease Paternal Grandfather    • Heart attack Maternal Grandmother    • Heart attack Paternal Grandmother 68           • Parkinsonism Maternal Uncle    • Diabetes Maternal Aunt        The following portion of the patient's history were reviewed and updated as appropriate: past medical history, past surgical history, past social history, past family history, allergies, current medications, and problem list.    Review of Systems   Constitution:  Positive for malaise/fatigue. Negative for chills, diaphoresis, fever, night sweats, weight gain and weight loss.   HENT: Negative for hearing loss, nosebleeds, sore throat and tinnitus.    Eyes: Negative for blurred vision, double vision, pain and visual disturbance.   Cardiovascular: Positive for dyspnea on exertion. Negative for chest pain, claudication, cyanosis, irregular heartbeat, leg swelling, near-syncope, orthopnea, palpitations, paroxysmal nocturnal dyspnea and syncope.   Respiratory: Positive for shortness of breath. Negative for cough, hemoptysis, snoring and wheezing.    Endocrine: Negative for cold intolerance, heat intolerance and polyuria.   Hematologic/Lymphatic: Negative for bleeding problem. Does not bruise/bleed easily.   Skin: Negative for color change, dry skin, flushing and itching.   Musculoskeletal: Negative for falls, joint pain, joint swelling, muscle cramps, muscle weakness and myalgias.   Gastrointestinal: Negative for abdominal pain, constipation, heartburn, melena, nausea and vomiting.   Genitourinary: Negative for dysuria and hematuria.   Neurological: Positive for light-headedness. Negative for excessive daytime sleepiness, dizziness, loss of balance, numbness, paresthesias, seizures and vertigo.   Psychiatric/Behavioral: Negative for altered mental status, depression, memory loss and substance abuse. The patient does not have insomnia and is not nervous/anxious.    Allergic/Immunologic: Negative for environmental allergies.       No Known Allergies      Current Outpatient Medications:   •  aspirin 81 MG tablet, Take 81 mg by mouth daily., Disp: , Rfl:   •  atorvastatin (LIPITOR) 40 MG tablet, Take 1 tablet by mouth Every Night., Disp: 30 tablet, Rfl: 11  •  imiquimod (ALDARA) 5 % cream, Apply  topically to the appropriate area as directed 3 (Three) Times a Week., Disp: , Rfl:   •  ticagrelor (BRILINTA) 90 MG tablet tablet, Take 1 tablet by mouth 2 (Two) Times a Day., Disp: 60  "tablet, Rfl: 11  No current facility-administered medications for this visit.         Objective:     Vitals:    10/24/19 1235 10/24/19 1240 10/24/19 1243   BP: 136/70 134/70 128/70   BP Location: Left arm Left arm Left arm   Patient Position: Lying Sitting Standing   Pulse: 60 69 70   Weight: 78.5 kg (173 lb)     Height: 172.7 cm (68\")       Body mass index is 26.3 kg/m².    PHYSICAL EXAM:    Vitals Reviewed.   General Appearance: No acute distress, well developed and well nourished. Obese.   Eyes: Conjunctiva and lids: No erythema, swelling, or discharge. Sclera non-icteric.   HENT: Atraumatic, normocephalic. External eyes, ears, and nose normal. No hearing loss noted. Mucous membranes normal. Lips not cyanotic. Neck supple with no tenderness.  Respiratory: No signs of respiratory distress. Respiration rhythm and depth normal.   Clear to auscultation. No rales, crackles, rhonchi, or wheezing auscultated.   Cardiovascular:  Jugular Venous Pressure: Normal  Heart Rate and Rhythm: Normal, Heart Sounds: Normal S1 and S2. No S3 or S4 noted.  Murmurs: No murmurs noted. No rubs, thrills, or gallops.   Arterial Pulses: Carotid pulses normal. No carotid bruit noted. Posterior tibialis and dorsalis pedis pulses normal.   Lower Extremities: No edema noted.  Gastrointestinal:  Abdomen soft, non-distended, non-tender. Normal bowel sounds. No hepatomegaly.   Musculoskeletal: Normal movement of extremities  Skin and Nails: General appearance normal. No pallor, cyanosis, diaphoresis. Skin temperature normal. No clubbing of fingernails.   Psychiatric: Patient alert and oriented to person, place, and time. Speech and behavior appropriate. Normal mood and affect.       ECG 12 Lead  Date/Time: 10/24/2019 12:33 PM  Performed by: Massiel Caballero APRN  Authorized by: Massiel Caballero APRN   Comparison: compared with previous ECG from 10/23/2019  Similar to previous ECG  Rhythm: sinus rhythm  Rate: normal  BPM: 60  Conduction: " conduction normal  ST Segments: ST segments normal  T Waves: T waves normal  QRS axis: normal    Clinical impression: normal ECG              Assessment:       Diagnosis Plan   1. Shortness of breath  Stress Test With Myocardial Perfusion One Day    Adult Transthoracic Echo Complete W/ Cont if Necessary Per Protocol   2. Tired     3. Coronary artery disease involving native coronary artery of native heart without angina pectoris  Stress Test With Myocardial Perfusion One Day    Adult Transthoracic Echo Complete W/ Cont if Necessary Per Protocol   4. Presence of drug coated stent in LAD coronary artery            Plan:       1/2.  Shortness of Breath ( changes in breathing) and Fatigue: He feels that the symptoms are similar to his anginal equivalents before his previous stent was placed.  He also had chest pain with the stent placement in which he currently denies chest pain.  I recommended an echocardiogram and walking Myoview stress test to be completed. He also feels that his symptoms could be from imiquimod and he will be done with the cream next Monday. He could also be experiencing dyspnea from the ticagrelor, but it would be unusual to develop dyspnea 6 months after the start of the medication and he drinks plenty of caffeine throughout the day.  If the dyspnea continues by the end next week, then we may consider switching him to clopidogrel.    3/4.  Coronary Artery Disease: Status post stent to the LAD March 2019.    Coronary Artery Disease  Assessment  • The patient has CCS class III - angina with activities of daily living  • The patient has stable angina     Plan  • Lifestyle modifications discussed include adhering to a heart healthy diet, maintenance of a healthy weight, medication compliance, regular exercise and regular monitoring of cholesterol and blood pressure    Subjective - Objective  • There has been a previous stent procedure using PRASANNA  • Current antiplatelet therapy includes aspirin 81 mg  and ticagrelor 90 mg  • The patient qualifies for cardiac rehabilitation      4.  Further recommendations to follow after cardiac testing is completed.    As always, it has been a pleasure to participate in your patient's care. Thank you.       Sincerely,         ILDEFONSO Duffy        Dictated utilizing Dragon dictation

## 2019-11-11 ENCOUNTER — HOSPITAL ENCOUNTER (OUTPATIENT)
Dept: CARDIOLOGY | Facility: HOSPITAL | Age: 69
Discharge: HOME | End: 2019-11-11
Admitting: NURSE PRACTITIONER

## 2019-11-11 ENCOUNTER — HOSPITAL ENCOUNTER (OUTPATIENT)
Dept: CARDIOLOGY | Facility: HOSPITAL | Age: 69
Discharge: HOME | End: 2019-11-11

## 2019-11-11 VITALS
HEIGHT: 68 IN | DIASTOLIC BLOOD PRESSURE: 80 MMHG | HEART RATE: 82 BPM | WEIGHT: 173 LBS | BODY MASS INDEX: 26.22 KG/M2 | SYSTOLIC BLOOD PRESSURE: 140 MMHG

## 2019-11-11 DIAGNOSIS — I25.10 CORONARY ARTERY DISEASE INVOLVING NATIVE CORONARY ARTERY OF NATIVE HEART WITHOUT ANGINA PECTORIS: ICD-10-CM

## 2019-11-11 DIAGNOSIS — R06.02 SHORTNESS OF BREATH: ICD-10-CM

## 2019-11-11 LAB
AORTIC ARCH: 1.9 CM
AORTIC ROOT ANNULUS: 2.4 CM
ASCENDING AORTA: 3.3 CM
BH CV ECHO MEAS - ACS: 1.9 CM
BH CV ECHO MEAS - AO MAX PG (FULL): 1.8 MMHG
BH CV ECHO MEAS - AO MAX PG: 7.9 MMHG
BH CV ECHO MEAS - AO MEAN PG (FULL): 1.4 MMHG
BH CV ECHO MEAS - AO MEAN PG: 4.6 MMHG
BH CV ECHO MEAS - AO V2 MAX: 140.6 CM/SEC
BH CV ECHO MEAS - AO V2 MEAN: 102.2 CM/SEC
BH CV ECHO MEAS - AO V2 VTI: 31.3 CM
BH CV ECHO MEAS - ASC AORTA: 3.3 CM
BH CV ECHO MEAS - AVA(I,A): 2.4 CM^2
BH CV ECHO MEAS - AVA(I,D): 2.4 CM^2
BH CV ECHO MEAS - AVA(V,A): 2.7 CM^2
BH CV ECHO MEAS - AVA(V,D): 2.7 CM^2
BH CV ECHO MEAS - BSA(HAYCOCK): 2 M^2
BH CV ECHO MEAS - BSA: 1.9 M^2
BH CV ECHO MEAS - BZI_BMI: 26.3 KILOGRAMS/M^2
BH CV ECHO MEAS - BZI_METRIC_HEIGHT: 172.7 CM
BH CV ECHO MEAS - BZI_METRIC_WEIGHT: 78.5 KG
BH CV ECHO MEAS - EDV(MOD-SP2): 98 ML
BH CV ECHO MEAS - EDV(MOD-SP4): 81 ML
BH CV ECHO MEAS - EDV(TEICH): 99.4 ML
BH CV ECHO MEAS - EF(CUBED): 78.4 %
BH CV ECHO MEAS - EF(MOD-BP): 61 %
BH CV ECHO MEAS - EF(MOD-SP2): 62.2 %
BH CV ECHO MEAS - EF(MOD-SP4): 59.3 %
BH CV ECHO MEAS - EF(TEICH): 70.7 %
BH CV ECHO MEAS - ESV(MOD-SP2): 37 ML
BH CV ECHO MEAS - ESV(MOD-SP4): 33 ML
BH CV ECHO MEAS - ESV(TEICH): 29.1 ML
BH CV ECHO MEAS - FS: 40 %
BH CV ECHO MEAS - IVS/LVPW: 0.89
BH CV ECHO MEAS - IVSD: 0.96 CM
BH CV ECHO MEAS - LAT PEAK E' VEL: 13 CM/SEC
BH CV ECHO MEAS - LV DIASTOLIC VOL/BSA (35-75): 42.1 ML/M^2
BH CV ECHO MEAS - LV MASS(C)D: 163.9 GRAMS
BH CV ECHO MEAS - LV MASS(C)DI: 85.3 GRAMS/M^2
BH CV ECHO MEAS - LV MAX PG: 6.1 MMHG
BH CV ECHO MEAS - LV MEAN PG: 3.2 MMHG
BH CV ECHO MEAS - LV SYSTOLIC VOL/BSA (12-30): 17.2 ML/M^2
BH CV ECHO MEAS - LV V1 MAX: 123.4 CM/SEC
BH CV ECHO MEAS - LV V1 MEAN: 83.2 CM/SEC
BH CV ECHO MEAS - LV V1 VTI: 24.4 CM
BH CV ECHO MEAS - LVIDD: 4.6 CM
BH CV ECHO MEAS - LVIDS: 2.8 CM
BH CV ECHO MEAS - LVLD AP2: 9 CM
BH CV ECHO MEAS - LVLD AP4: 7.8 CM
BH CV ECHO MEAS - LVLS AP2: 7.4 CM
BH CV ECHO MEAS - LVLS AP4: 7 CM
BH CV ECHO MEAS - LVOT AREA (M): 3.1 CM^2
BH CV ECHO MEAS - LVOT AREA: 3.1 CM^2
BH CV ECHO MEAS - LVOT DIAM: 2 CM
BH CV ECHO MEAS - LVPWD: 1.1 CM
BH CV ECHO MEAS - MED PEAK E' VEL: 12 CM/SEC
BH CV ECHO MEAS - MR MAX PG: 38 MMHG
BH CV ECHO MEAS - MR MAX VEL: 308.3 CM/SEC
BH CV ECHO MEAS - MV A DUR: 0.09 SEC
BH CV ECHO MEAS - MV A MAX VEL: 69 CM/SEC
BH CV ECHO MEAS - MV DEC SLOPE: 300.6 CM/SEC^2
BH CV ECHO MEAS - MV DEC TIME: 0.26 SEC
BH CV ECHO MEAS - MV E MAX VEL: 76.3 CM/SEC
BH CV ECHO MEAS - MV E/A: 1.1
BH CV ECHO MEAS - MV MAX PG: 2.5 MMHG
BH CV ECHO MEAS - MV MEAN PG: 1.1 MMHG
BH CV ECHO MEAS - MV P1/2T MAX VEL: 78.4 CM/SEC
BH CV ECHO MEAS - MV P1/2T: 76.4 MSEC
BH CV ECHO MEAS - MV V2 MAX: 79.5 CM/SEC
BH CV ECHO MEAS - MV V2 MEAN: 50.8 CM/SEC
BH CV ECHO MEAS - MV V2 VTI: 30.8 CM
BH CV ECHO MEAS - MVA P1/2T LCG: 2.8 CM^2
BH CV ECHO MEAS - MVA(P1/2T): 2.9 CM^2
BH CV ECHO MEAS - MVA(VTI): 2.4 CM^2
BH CV ECHO MEAS - PA ACC TIME: 0.13 SEC
BH CV ECHO MEAS - PA MAX PG (FULL): 4.6 MMHG
BH CV ECHO MEAS - PA MAX PG: 9.2 MMHG
BH CV ECHO MEAS - PA PR(ACCEL): 20.8 MMHG
BH CV ECHO MEAS - PA V2 MAX: 151.3 CM/SEC
BH CV ECHO MEAS - PULM A REVS DUR: 0.1 SEC
BH CV ECHO MEAS - PULM A REVS VEL: 27 CM/SEC
BH CV ECHO MEAS - PULM DIAS VEL: 63.8 CM/SEC
BH CV ECHO MEAS - PULM S/D: 0.85
BH CV ECHO MEAS - PULM SYS VEL: 54.4 CM/SEC
BH CV ECHO MEAS - PVA(V,A): 1.5 CM^2
BH CV ECHO MEAS - PVA(V,D): 1.5 CM^2
BH CV ECHO MEAS - QP/QS: 0.59
BH CV ECHO MEAS - RAP SYSTOLE: 3 MMHG
BH CV ECHO MEAS - RV MAX PG: 4.5 MMHG
BH CV ECHO MEAS - RV MEAN PG: 2.4 MMHG
BH CV ECHO MEAS - RV V1 MAX: 106.3 CM/SEC
BH CV ECHO MEAS - RV V1 MEAN: 74.3 CM/SEC
BH CV ECHO MEAS - RV V1 VTI: 20.8 CM
BH CV ECHO MEAS - RVOT AREA: 2.1 CM^2
BH CV ECHO MEAS - RVOT DIAM: 1.6 CM
BH CV ECHO MEAS - SI(CUBED): 40.8 ML/M^2
BH CV ECHO MEAS - SI(LVOT): 39 ML/M^2
BH CV ECHO MEAS - SI(MOD-SP2): 31.7 ML/M^2
BH CV ECHO MEAS - SI(MOD-SP4): 25 ML/M^2
BH CV ECHO MEAS - SI(TEICH): 36.5 ML/M^2
BH CV ECHO MEAS - SUP REN AO DIAM: 2 CM
BH CV ECHO MEAS - SV(CUBED): 78.4 ML
BH CV ECHO MEAS - SV(LVOT): 74.9 ML
BH CV ECHO MEAS - SV(MOD-SP2): 61 ML
BH CV ECHO MEAS - SV(MOD-SP4): 48 ML
BH CV ECHO MEAS - SV(RVOT): 43.9 ML
BH CV ECHO MEAS - SV(TEICH): 70.2 ML
BH CV ECHO MEAS - TAPSE (>1.6): 2.9 CM2
BH CV ECHO MEASUREMENTS AVERAGE E/E' RATIO: 6.1
BH CV STRESS BP STAGE 1: NORMAL
BH CV STRESS BP STAGE 2: NORMAL
BH CV STRESS BP STAGE 3: NORMAL
BH CV STRESS DURATION MIN STAGE 1: 3
BH CV STRESS DURATION MIN STAGE 2: 3
BH CV STRESS DURATION MIN STAGE 3: 3
BH CV STRESS DURATION SEC STAGE 1: 0
BH CV STRESS DURATION SEC STAGE 2: 0
BH CV STRESS DURATION SEC STAGE 3: 0
BH CV STRESS GRADE STAGE 1: 10
BH CV STRESS GRADE STAGE 2: 12
BH CV STRESS GRADE STAGE 3: 14
BH CV STRESS HR STAGE 1: 106
BH CV STRESS HR STAGE 2: 125
BH CV STRESS HR STAGE 3: 148
BH CV STRESS METS STAGE 1: 5
BH CV STRESS METS STAGE 2: 7.5
BH CV STRESS METS STAGE 3: 10
BH CV STRESS PROTOCOL 1: NORMAL
BH CV STRESS RECOVERY BP: NORMAL MMHG
BH CV STRESS RECOVERY HR: 91 BPM
BH CV STRESS SPEED STAGE 1: 1.7
BH CV STRESS SPEED STAGE 2: 2.5
BH CV STRESS SPEED STAGE 3: 3.4
BH CV STRESS STAGE 1: 1
BH CV STRESS STAGE 2: 2
BH CV STRESS STAGE 3: 3
BH CV XLRA - RV BASE: 3.6 CM
BH CV XLRA - RV LENGTH: 8.3 CM
BH CV XLRA - RV MID: 3.4 CM
BH CV XLRA - TDI S': 15 CM/SEC
LEFT ATRIUM VOLUME INDEX: 32 ML/M2
LV EF 2D ECHO EST: 61 %
LV EF NUC BP: 64 %
MAXIMAL PREDICTED HEART RATE: 152 BPM
MAXIMAL PREDICTED HEART RATE: 152 BPM
PERCENT MAX PREDICTED HR: 97.37 %
SINUS: 3.3 CM
STJ: 2.8 CM
STRESS BASELINE BP: NORMAL MMHG
STRESS BASELINE HR: 70 BPM
STRESS PERCENT HR: 115 %
STRESS POST ESTIMATED WORKLOAD: 10 METS
STRESS POST EXERCISE DUR MIN: 9 MIN
STRESS POST EXERCISE DUR SEC: 0 SEC
STRESS POST PEAK BP: NORMAL MMHG
STRESS POST PEAK HR: 148 BPM
STRESS TARGET HR: 129 BPM
STRESS TARGET HR: 129 BPM

## 2019-11-11 PROCEDURE — A9502 TC99M TETROFOSMIN: HCPCS | Performed by: NURSE PRACTITIONER

## 2019-11-11 PROCEDURE — 93306 TTE W/DOPPLER COMPLETE: CPT | Performed by: INTERNAL MEDICINE

## 2019-11-11 PROCEDURE — 93017 CV STRESS TEST TRACING ONLY: CPT

## 2019-11-11 PROCEDURE — 78452 HT MUSCLE IMAGE SPECT MULT: CPT

## 2019-11-11 PROCEDURE — 25010000002 PERFLUTREN (DEFINITY) 8.476 MG IN SODIUM CHLORIDE 0.9 % 10 ML INJECTION: Performed by: NURSE PRACTITIONER

## 2019-11-11 PROCEDURE — 0 TECHNETIUM TETROFOSMIN KIT: Performed by: NURSE PRACTITIONER

## 2019-11-11 PROCEDURE — 93306 TTE W/DOPPLER COMPLETE: CPT

## 2019-11-11 PROCEDURE — 78452 HT MUSCLE IMAGE SPECT MULT: CPT | Performed by: INTERNAL MEDICINE

## 2019-11-11 PROCEDURE — 93016 CV STRESS TEST SUPVJ ONLY: CPT | Performed by: INTERNAL MEDICINE

## 2019-11-11 PROCEDURE — 93018 CV STRESS TEST I&R ONLY: CPT | Performed by: INTERNAL MEDICINE

## 2019-11-11 RX ADMIN — TETROFOSMIN 1 DOSE: 1.38 INJECTION, POWDER, LYOPHILIZED, FOR SOLUTION INTRAVENOUS at 10:31

## 2019-11-11 RX ADMIN — PERFLUTREN 1.5 ML: 6.52 INJECTION, SUSPENSION INTRAVENOUS at 09:22

## 2019-11-11 RX ADMIN — TETROFOSMIN 1 DOSE: 1.38 INJECTION, POWDER, LYOPHILIZED, FOR SOLUTION INTRAVENOUS at 09:34

## 2020-01-16 RX ORDER — ATORVASTATIN CALCIUM 40 MG/1
TABLET, FILM COATED ORAL
Qty: 90 TABLET | Refills: 1 | Status: SHIPPED | OUTPATIENT
Start: 2020-01-16 | End: 2020-07-15

## 2020-02-17 ENCOUNTER — OFFICE VISIT (OUTPATIENT)
Dept: CARDIOLOGY | Facility: CLINIC | Age: 70
End: 2020-02-17

## 2020-02-17 VITALS
DIASTOLIC BLOOD PRESSURE: 80 MMHG | HEART RATE: 59 BPM | BODY MASS INDEX: 26.37 KG/M2 | SYSTOLIC BLOOD PRESSURE: 120 MMHG | HEIGHT: 68 IN | WEIGHT: 174 LBS

## 2020-02-17 DIAGNOSIS — Z95.5 PRESENCE OF DRUG COATED STENT IN LAD CORONARY ARTERY: ICD-10-CM

## 2020-02-17 DIAGNOSIS — E78.6 LOW HDL (UNDER 40): ICD-10-CM

## 2020-02-17 DIAGNOSIS — I25.2 HISTORY OF NON-ST ELEVATION MYOCARDIAL INFARCTION (NSTEMI): Chronic | ICD-10-CM

## 2020-02-17 DIAGNOSIS — I25.10 CORONARY ARTERY DISEASE INVOLVING NATIVE CORONARY ARTERY OF NATIVE HEART WITHOUT ANGINA PECTORIS: Primary | Chronic | ICD-10-CM

## 2020-02-17 DIAGNOSIS — I65.23 ATHEROSCLEROSIS OF BOTH CAROTID ARTERIES: ICD-10-CM

## 2020-02-17 PROCEDURE — 99214 OFFICE O/P EST MOD 30 MIN: CPT | Performed by: INTERNAL MEDICINE

## 2020-02-17 RX ORDER — CLOPIDOGREL BISULFATE 75 MG/1
75 TABLET ORAL DAILY
Qty: 90 TABLET | Refills: 3 | Status: SHIPPED | OUTPATIENT
Start: 2020-02-17 | End: 2021-02-15

## 2020-02-17 NOTE — PROGRESS NOTES
Subjective:     Encounter Date: 02/17/20        Patient ID: Jama Birch III is a 69 y.o. male.    Chief Complaint: CAD  History of Present Illness    Dear Dr. Moses,    I had the pleasure of seeing this patient in the office today in follow-up of his CAD.    In October because he was having some shortness of breath.  He had a stress test and echocardiogram that time that were normal.  He says the shortness of breath went away-he wonders if he did not have a little viral illness at the time.  He was having some generalized malaise at the time.    Since that he is been feeling great with no complaints.  No chest pain or chest discomfort no shortness of breath.  No palpitations or tachycardia.  Stress test October 2019:  Interpretation Summary     · Myocardial perfusion imaging indicates a normal myocardial perfusion study with no evidence of ischemia.  · Left ventricular ejection fraction is normal (Calculated EF = 64%).  · Impressions are consistent with a low risk study.  · There is no prior study available for comparison.        Echocardiogram October 2019:  Interpretation Summary     · Left ventricular systolic function is normal. Calculated EF = 61%. Estimated EF was in agreement with the calculated EF. Estimated EF = 61%. Normal left ventricular cavity size and wall thickness noted. All left ventricular wall segments contract normally. Left ventricular diastolic function is normal.  · Left atrial volume is mildly increased. Saline test results are negative.  · There is moderate thickening of the noncoronary cusp of the aortic valve.  · Moderate MAC is present. Mild mitral valve regurgitation is present.  · Physiologic tricuspid valve regurgitation is present. Insufficient TR velocity profile to estimate the right ventricular systolic pressure.          As you know he initially presented with chest discomfort to [facility].  Initial troponin was elevated 0.3.  He was  admitted to the hospital, treated with oral and parenteral anti-ischemic therapy, and then had a cardiac catheterization that afternoon that demonstrated complete occlusion of the LAD in the mid segment.  This was then treated with a drug-eluting stent.    Patient states that since going home he has had complete resolution of the angina pectoris that he was experiencing beforehand.  He is back to doing about 8000 steps a day and is interested in increasing his activity back to what he was doing before.  He is not having chest pain or chest discomfort.  No shortness of breath with activity.  No tachycardia or palpitations.  Cath 3/2019:  Hemodynamics:   LV:103/4  AO: 103/62        PCI CORONARY SEGMENT: Mid LAD  PRE-STENOSIS:100  POST-STENOSIS: 0%  LESION TYPE: c  SHILOH FLOW PRE/POST: 0/3  CULPRIT LESION: Yes     Estimated blood loss: Minimal  Complications: None     Conclusions:   1. Left main: Normal  2. LAD: Acutely occluded in the midsegment  3. LCX: Dominant vessel.  Normal.  4. RCA: Small caliber nondominant vessel.  Normal.  5.  Normal left ventricular size.  Mildly reduced systolic function.  Mild anterolateral hypokinesis.  Severe mid to distal inferior wall hypokinesis  7.  PCI of the proximal mid LAD with a 3.5 x 28 mm Xience CRI drug-eluting stent     Recommendations dual antiplatelet therapy for 1 year     Echo 3/2019:  Interpretation Summary     · Estimated EF = 61%.  · Left ventricular systolic function is normal.  · The following left ventricular wall segments are mildly hypokinetic: apical septal, apex hypokinetic and mid anteroseptal.       The following portions of the patient's history were reviewed and updated as appropriate: allergies, current medications, past family history, past medical history, past social history, past surgical history and problem list.    Past Medical History:   Diagnosis Date   • Abnormal CT scan; splenic flexure thickening 6/16/2016   • Atherosclerosis of both carotid  arteries    • Coronary artery disease involving native coronary artery of native heart without angina pectoris 4/4/2019   • Kidney stone    • Precordial pain    • Prostate disorder    • Shortness of breath        Past Surgical History:   Procedure Laterality Date   • CARDIAC CATHETERIZATION  03/26/2019    Coronary Angiography   • CARDIAC CATHETERIZATION  03/26/2019    Left Ventriculography   • CARDIAC CATHETERIZATION  03/26/2019    Left Heart Cath   • CARDIAC CATHETERIZATION  03/26/2019    Stent PRASANNA Coronary   • CARDIAC CATHETERIZATION N/A 3/26/2019    Procedure: Left Heart Cath;  Surgeon: Sachin Barrera MD;  Location:  [facility] CATH INVASIVE LOCATION;  Service: Cardiology   • CARDIAC CATHETERIZATION N/A 3/26/2019    Procedure: Left ventriculography;  Surgeon: Sachin Barrera MD;  Location:  [facility] CATH INVASIVE LOCATION;  Service: Cardiology   • CARDIAC CATHETERIZATION N/A 3/26/2019    Procedure: Coronary angiography;  Surgeon: Sachin Barrera MD;  Location:  [facility] CATH INVASIVE LOCATION;  Service: Cardiology   • CARDIAC CATHETERIZATION N/A 3/26/2019    Procedure: Stent PRASANNA coronary;  Surgeon: Sachin Barrera MD;  Location:  [facility] CATH INVASIVE LOCATION;  Service: Cardiology   • COLONOSCOPY  11/2017    second       Social History     Socioeconomic History   • Marital status:      Spouse name: Tracey   • Number of children: 3   • Years of education: Not on file   • Highest education level: Not on file   Occupational History   • Occupation: [employer]     Comment:    Social Needs   • Financial resource strain: Not hard at all   • Food insecurity:     Worry: Never true     Inability: Never true   • Transportation needs:     Medical: No     Non-medical: No   Tobacco Use   • Smoking status: Never Smoker   • Smokeless tobacco: Never Used   Substance and Sexual Activity   • Alcohol use: Yes     Alcohol/week: 1.0 standard drinks     Types: 1 Standard drinks or equivalent  per week     Frequency: Monthly or less     Drinks per session: 1 or 2     Binge frequency: Never     Comment: Caffeine use   • Drug use: No   • Sexual activity: Yes     Partners: Female     Birth control/protection: Other   Lifestyle   • Physical activity:     Days per week: 6 days     Minutes per session: 40 min   • Stress: Only a little   Relationships   • Social connections:     Talks on phone: More than three times a week     Gets together: Once a week     Attends Scientologist service: More than 4 times per year     Active member of club or organization: Yes     Attends meetings of clubs or organizations: More than 4 times per year     Relationship status:    Social History Narrative    Anniversary 1976     81 Coby, 84 Ernesto, 9/3/86 Lizbeth    5 Grandchildren       Review of Systems   Constitution: Negative for chills, decreased appetite, fever and night sweats.   HENT: Negative for ear discharge, ear pain, hearing loss, nosebleeds and sore throat.    Eyes: Negative for blurred vision, double vision and pain.   Cardiovascular: Negative for cyanosis.   Respiratory: Negative for hemoptysis and sputum production.    Endocrine: Negative for cold intolerance and heat intolerance.   Hematologic/Lymphatic: Negative for adenopathy.   Skin: Negative for dry skin, itching, nail changes, rash and suspicious lesions.   Musculoskeletal: Negative for arthritis, gout, muscle cramps, muscle weakness, myalgias and neck pain.   Gastrointestinal: Negative for anorexia, bowel incontinence, constipation, diarrhea, dysphagia, hematemesis and jaundice.   Genitourinary: Negative for bladder incontinence, dysuria, flank pain, frequency, hematuria and nocturia.   Neurological: Negative for focal weakness, numbness, paresthesias and seizures.   Psychiatric/Behavioral: Negative for altered mental status, hallucinations, hypervigilance, suicidal ideas and thoughts of violence.   Allergic/Immunologic: Negative for  "persistent infections.       Procedures       Objective:     Vitals:    02/17/20 1058   BP: 120/80   Pulse: 59   Weight: 78.9 kg (174 lb)   Height: 172.7 cm (68\")         Physical Exam   Constitutional: He is oriented to person, place, and time. He appears well-developed and well-nourished. No distress.   HENT:   Head: Normocephalic and atraumatic.   Nose: Nose normal.   Mouth/Throat: Oropharynx is clear and moist.   Eyes: Pupils are equal, round, and reactive to light. Conjunctivae and EOM are normal. Right eye exhibits no discharge. Left eye exhibits no discharge.   Neck: Normal range of motion. Neck supple. No tracheal deviation present. No thyromegaly present.   Cardiovascular: Normal rate, regular rhythm, S1 normal, S2 normal, normal heart sounds and normal pulses. Exam reveals no S3.   Pulmonary/Chest: Effort normal and breath sounds normal. No stridor. No respiratory distress. He exhibits no tenderness.   Abdominal: Soft. Bowel sounds are normal. He exhibits no distension and no mass. There is no tenderness. There is no rebound and no guarding.   Musculoskeletal: Normal range of motion. He exhibits no tenderness or deformity.   Lymphadenopathy:     He has no cervical adenopathy.   Neurological: He is alert and oriented to person, place, and time. He has normal reflexes.   Skin: Skin is warm and dry. No rash noted. He is not diaphoretic. No erythema.   Psychiatric: He has a normal mood and affect. Thought content normal.       Lab Review:             Performed        Assessment:          Diagnosis Plan   1. Coronary artery disease involving native coronary artery of native heart without angina pectoris  clopidogrel (PLAVIX) 75 MG tablet   2. Presence of drug coated stent in LAD coronary artery  clopidogrel (PLAVIX) 75 MG tablet   3. Low HDL (under 40)  clopidogrel (PLAVIX) 75 MG tablet   4. Atherosclerosis of both carotid arteries     5. History of non-ST elevation myocardial infarction (NSTEMI)          "   Plan:       1. Coronary Artery Disease  Assessment  • The patient has no angina    Plan  • Lifestyle modifications discussed include adhering to a heart healthy diet, avoidance of tobacco products, maintenance of a healthy weight, medication compliance, regular exercise and regular monitoring of cholesterol and blood pressure  • Patient is doing well.  We will stop his aspirin and ticagrelor and replace it with clopidogrel 75 mg take once daily.    Subjective - Objective  • There has been a previous stent procedure using PRASANNA  • Current antiplatelet therapy includes aspirin 81 mg and ticagrelor 90 mg  • The patient qualifies for cardiac rehabilitation, and has completed a cardiac rehab program        Thank you very much for allowing us to participate in the care of this pleasant patient.  Please don't hesitate to call if I can be of assistance in any way.      Current Outpatient Medications:   •  aspirin 81 MG tablet, Take 81 mg by mouth daily., Disp: , Rfl:   •  atorvastatin (LIPITOR) 40 MG tablet, TAKE 1 TABLET BY MOUTH EVERY DAY AT NIGHT, Disp: 90 tablet, Rfl: 1  •  ticagrelor (BRILINTA) 90 MG tablet tablet, Take 1 tablet by mouth 2 (Two) Times a Day., Disp: 60 tablet, Rfl: 11  •  imiquimod (ALDARA) 5 % cream, Apply  topically to the appropriate area as directed 3 (Three) Times a Week., Disp: , Rfl:

## 2020-03-16 ENCOUNTER — OFFICE VISIT (OUTPATIENT)
Dept: FAMILY MEDICINE CLINIC | Facility: CLINIC | Age: 70
End: 2020-03-16

## 2020-03-16 VITALS
BODY MASS INDEX: 26.22 KG/M2 | HEIGHT: 68 IN | TEMPERATURE: 97.7 F | WEIGHT: 173 LBS | HEART RATE: 58 BPM | SYSTOLIC BLOOD PRESSURE: 108 MMHG | DIASTOLIC BLOOD PRESSURE: 70 MMHG | OXYGEN SATURATION: 98 % | RESPIRATION RATE: 16 BRPM

## 2020-03-16 DIAGNOSIS — Z23 IMMUNIZATION DUE: ICD-10-CM

## 2020-03-16 DIAGNOSIS — R35.1 NOCTURIA: ICD-10-CM

## 2020-03-16 DIAGNOSIS — Z00.00 MEDICARE ANNUAL WELLNESS VISIT, SUBSEQUENT: Primary | ICD-10-CM

## 2020-03-16 PROBLEM — D04.4 SQUAMOUS CELL CARCINOMA IN SITU (SCCIS) OF SCALP: Status: ACTIVE | Noted: 2019-09-05

## 2020-03-16 PROCEDURE — G0009 ADMIN PNEUMOCOCCAL VACCINE: HCPCS | Performed by: FAMILY MEDICINE

## 2020-03-16 PROCEDURE — G0439 PPPS, SUBSEQ VISIT: HCPCS | Performed by: FAMILY MEDICINE

## 2020-03-16 PROCEDURE — 90732 PPSV23 VACC 2 YRS+ SUBQ/IM: CPT | Performed by: FAMILY MEDICINE

## 2020-03-16 NOTE — PATIENT INSTRUCTIONS
Annual flu shot has been recommended to patient. Optimal timing of this vaccination is in mid October of each year.       Fall Prevention in the Home, Adult  Falls can cause injuries and can affect people from all age groups. There are many simple things that you can do to make your home safe and to help prevent falls. Ask for help when making these changes, if needed.  What actions can I take to prevent falls?  General instructions  · Use good lighting in all rooms. Replace any light bulbs that burn out.  · Turn on lights if it is dark. Use night-lights.  · Place frequently used items in easy-to-reach places. Lower the shelves around your home if necessary.  · Set up furniture so that there are clear paths around it. Avoid moving your furniture around.  · Remove throw rugs and other tripping hazards from the floor.  · Avoid walking on wet floors.  · Fix any uneven floor surfaces.  · Add color or contrast paint or tape to grab bars and handrails in your home. Place contrasting color strips on the first and last steps of stairways.  · When you use a stepladder, make sure that it is completely opened and that the sides are firmly locked. Have someone hold the ladder while you are using it. Do not climb a closed stepladder.  · Be aware of any and all pets.  What can I do in the bathroom?         · Keep the floor dry. Immediately clean up any water that spills onto the floor.  · Remove soap buildup in the tub or shower on a regular basis.  · Use non-skid mats or decals on the floor of the tub or shower.  · Attach bath mats securely with double-sided, non-slip rug tape.  · If you need to sit down while you are in the shower, use a plastic, non-slip stool.  · Install grab bars by the toilet and in the tub and shower. Do not use towel bars as grab bars.  What can I do in the bedroom?  · Make sure that a bedside light is easy to reach.  · Do not use oversized bedding that drapes onto the floor.  · Have a firm chair that has  side arms to use for getting dressed.  What can I do in the kitchen?  · Clean up any spills right away.  · If you need to reach for something above you, use a sturdy step stool that has a grab bar.  · Keep electrical cables out of the way.  · Do not use floor polish or wax that makes floors slippery. If you must use wax, make sure that it is non-skid floor wax.  What can I do in the stairways?  · Do not leave any items on the stairs.  · Make sure that you have a light switch at the top of the stairs and the bottom of the stairs. Have them installed if you do not have them.  · Make sure that there are handrails on both sides of the stairs. Fix handrails that are broken or loose. Make sure that handrails are as long as the stairways.  · Install non-slip stair treads on all stairs in your home.  · Avoid having throw rugs at the top or bottom of stairways, or secure the rugs with carpet tape to prevent them from moving.  · Choose a carpet design that does not hide the edge of steps on the stairway.  · Check any carpeting to make sure that it is firmly attached to the stairs. Fix any carpet that is loose or worn.  What can I do on the outside of my home?  · Use bright outdoor lighting.  · Regularly repair the edges of walkways and driveways and fix any cracks.  · Remove high doorway thresholds.  · Trim any shrubbery on the main path into your home.  · Regularly check that handrails are securely fastened and in good repair. Both sides of any steps should have handrails.  · Install guardrails along the edges of any raised decks or porches.  · Clear walkways of debris and clutter, including tools and rocks.  · Have leaves, snow, and ice cleared regularly.  · Use sand or salt on walkways during winter months.  · In the garage, clean up any spills right away, including grease or oil spills.  What other actions can I take?  · Wear closed-toe shoes that fit well and support your feet. Wear shoes that have rubber soles or low  heels.  · Use mobility aids as needed, such as canes, walkers, scooters, and crutches.  · Review your medicines with your health care provider. Some medicines can cause dizziness or changes in blood pressure, which increase your risk of falling.  Talk with your health care provider about other ways that you can decrease your risk of falls. This may include working with a physical therapist or  to improve your strength, balance, and endurance.  Where to find more information  · Centers for Disease Control and Prevention, STEADI: https://www.cdc.gov  · National [place] on Aging: https://lu6weei.antwon.nih.gov  Contact a health care provider if:  · You are afraid of falling at home.  · You feel weak, drowsy, or dizzy at home.  · You fall at home.  Summary  · There are many simple things that you can do to make your home safe and to help prevent falls.  · Ways to make your home safe include removing tripping hazards and installing grab bars in the bathroom.  · Ask for help when making these changes in your home.  This information is not intended to replace advice given to you by your health care provider. Make sure you discuss any questions you have with your health care provider.  Document Released: 12/08/2003 Document Revised: 08/02/2018 Document Reviewed: 08/02/2018  Elsevier Interactive Patient Education © 2020 [employer]

## 2020-03-16 NOTE — PROGRESS NOTES
The ABCs of the Annual Wellness Visit  Subsequent Medicare Wellness Visit    Chief Complaint   Patient presents with   • Medicare Wellness-subsequent       Subjective   History of Present Illness:  Jama Birch III is a 69 y.o. male who presents for a Subsequent Medicare Wellness Visit.    HEALTH RISK ASSESSMENT    Recent Hospitalizations:  Recently treated at the following:  [facility] for stent placement in 3/2019    Current Medical Providers:  Patient Care Team:  Jama Moses MD as PCP - General (Family Medicine)  Jama Moses MD as PCP - Claims Attributed  Alfie Hooks MD as Consulting Physician (General Surgery)  Neri Lin MD as Consulting Physician (Ophthalmology)  Jama Tobin (Dental General Practice)  Vicente Schmitz III, MD as Consulting Physician (Cardiology)  Jama Lopez MD as Consulting Physician (Dermatology)    Smoking Status:  Social History     Tobacco Use   Smoking Status Never Smoker   Smokeless Tobacco Never Used       Alcohol Consumption:  Social History     Substance and Sexual Activity   Alcohol Use Yes   • Alcohol/week: 1.0 standard drinks   • Types: 1 drink(s) per week   • Frequency: Monthly or less   • Drinks per session: 1 or 2   • Binge frequency: Never    Comment: Caffeine use       Depression Screen:   PHQ-2/PHQ-9 Depression Screening 3/16/2020   Little interest or pleasure in doing things 0   Feeling down, depressed, or hopeless 0   Total Score 0       Fall Risk Screen:  STEADI Fall Risk Assessment was completed, and patient is at LOW risk for falls.Assessment completed on:3/16/2020    Health Habits and Functional and Cognitive Screening:  Functional & Cognitive Status 3/16/2020   Do you have difficulty preparing food and eating? No   Do you have difficulty bathing yourself, getting dressed or grooming yourself? No   Do you have difficulty using the toilet? No   Do you have difficulty moving around from place to place? No   Do you have  trouble with steps or getting out of a bed or a chair? No   Current Diet Well Balanced Diet   Dental Exam Up to date   Eye Exam Up to date   Exercise (times per week) 6 times per week   Current Exercise Activities Include Walking   Do you need help using the phone?  No   Are you deaf or do you have serious difficulty hearing?  No   Do you need help with transportation? No   Do you need help shopping? No   Do you need help preparing meals?  No   Do you need help with housework?  No   Do you need help with laundry? No   Do you need help taking your medications? No   Do you need help managing money? No   Do you ever drive or ride in a car without wearing a seat belt? No   Have you felt unusual stress, anger or loneliness in the last month? No   Who do you live with? Spouse   If you need help, do you have trouble finding someone available to you? No   Have you been bothered in the last four weeks by sexual problems? No   Do you have difficulty concentrating, remembering or making decisions? No         Does the patient have evidence of cognitive impairment? No    Asprin use counseling:On clopidrogel as an alternative (due to ASA contraindication)    Age-appropriate Screening Schedule:  Refer to the list below for future screening recommendations based on patient's age, sex and/or medical conditions. Orders for these recommended tests are listed in the plan section. The patient has been provided with a written plan.    Health Maintenance   Topic Date Due   • TDAP/TD VACCINES (2 - Td) 05/09/2026   • COLONOSCOPY  01/13/2027   • INFLUENZA VACCINE  Completed   • ZOSTER VACCINE  Completed          The following portions of the patient's history were reviewed and updated as appropriate: allergies, current medications, past family history, past medical history, past social history, past surgical history and problem list.    Outpatient Medications Prior to Visit   Medication Sig Dispense Refill   • atorvastatin (LIPITOR) 40 MG  "tablet TAKE 1 TABLET BY MOUTH EVERY DAY AT NIGHT 90 tablet 1   • clopidogrel (PLAVIX) 75 MG tablet Take 1 tablet by mouth Daily. 90 tablet 3   • imiquimod (ALDARA) 5 % cream Apply  topically to the appropriate area as directed 3 (Three) Times a Week.       No facility-administered medications prior to visit.        Patient Active Problem List   Diagnosis   • Lipoma of torso   • Nocturia   • History of kidney stones   • Atherosclerosis of both carotid arteries   • Coronary artery disease involving native coronary artery of native heart without angina pectoris   • Low HDL (under 40)   • Presence of drug coated stent in LAD coronary artery   • Actinic keratosis of scalp   • History of non-ST elevation myocardial infarction (NSTEMI)   • Squamous cell carcinoma in situ (SCCIS) of scalp       Advanced Care Planning:  ACP discussion was held with the patient during this visit. Patient has an advance directive in EMR which is still valid.     Review of Systems    Compared to one year ago, the patient feels his physical health is better.  Compared to one year ago, the patient feels his mental health is the same.    Reviewed chart for potential of high risk medication in the elderly: yes  Reviewed chart for potential of harmful drug interactions in the elderly:yes    Objective         Vitals:    03/16/20 0959   BP: 108/70   Pulse: 58   Resp: 16   Temp: 97.7 °F (36.5 °C)   TempSrc: Oral   SpO2: 98%   Weight: 78.5 kg (173 lb)   Height: 172.7 cm (68\")   PainSc: 0-No pain       Body mass index is 26.3 kg/m².  Discussed the patient's BMI with him. The BMI is above average; BMI management plan is completed.    Physical Exam          Assessment/Plan   Medicare Risks and Personalized Health Plan  CMS Preventative Services Quick Reference  Advance Directive Discussion  Cardiovascular risk  Fall Risk  Immunizations Discussed/Encouraged (specific immunizations; Pneumococcal 23 )  Obesity/Overweight   Prostate Cancer Screening     The " above risks/problems have been discussed with the patient.  Pertinent information has been shared with the patient in the After Visit Summary.  Follow up plans and orders are seen below in the Assessment/Plan Section.    Diagnoses and all orders for this visit:    1. Medicare annual wellness visit, subsequent (Primary)  Comments:  Patient due for pneumococcal 23.  Information on fall prevention, diet, shared.  PSA screening due.    2. Nocturia  -     PSA DIAGNOSTIC    3. Immunization due  -     Pneumococcal Polysaccharide Vaccine 23-Valent Greater Than or Equal To 3yo Subcutaneous / IM      Follow Up:  Return in about 1 year (around 3/16/2021) for Medicare Wellness Visit.     An After Visit Summary and PPPS were given to the patient.

## 2020-03-17 LAB — PSA SERPL-MCNC: 0.44 NG/ML (ref 0–4)

## 2020-07-14 ENCOUNTER — RESULTS ENCOUNTER (OUTPATIENT)
Dept: FAMILY MEDICINE CLINIC | Facility: CLINIC | Age: 70
End: 2020-07-14

## 2020-07-14 DIAGNOSIS — R73.9 ELEVATED BLOOD SUGAR LEVEL: ICD-10-CM

## 2020-07-14 DIAGNOSIS — E78.6 LOW HDL (UNDER 40): ICD-10-CM

## 2020-07-14 DIAGNOSIS — R35.1 NOCTURIA: ICD-10-CM

## 2020-07-14 DIAGNOSIS — I65.23 ATHEROSCLEROSIS OF BOTH CAROTID ARTERIES: ICD-10-CM

## 2020-07-14 DIAGNOSIS — I25.10 CORONARY ARTERY DISEASE INVOLVING NATIVE CORONARY ARTERY OF NATIVE HEART WITHOUT ANGINA PECTORIS: Chronic | ICD-10-CM

## 2020-07-15 DIAGNOSIS — E78.2 MIXED HYPERLIPIDEMIA: Primary | ICD-10-CM

## 2020-07-15 RX ORDER — ATORVASTATIN CALCIUM 40 MG/1
TABLET, FILM COATED ORAL
Qty: 30 TABLET | Refills: 0 | Status: SHIPPED | OUTPATIENT
Start: 2020-07-15 | End: 2020-08-10

## 2020-07-22 ENCOUNTER — LAB (OUTPATIENT)
Dept: LAB | Facility: HOSPITAL | Age: 70
End: 2020-07-22

## 2020-07-22 DIAGNOSIS — E78.2 MIXED HYPERLIPIDEMIA: ICD-10-CM

## 2020-07-22 LAB
ALBUMIN SERPL-MCNC: 4.3 G/DL (ref 3.5–5.2)
ALBUMIN/GLOB SERPL: 1.5 G/DL
ALP SERPL-CCNC: 53 U/L (ref 39–117)
ALT SERPL W P-5'-P-CCNC: 24 U/L (ref 1–41)
ANION GAP SERPL CALCULATED.3IONS-SCNC: 9.2 MMOL/L (ref 5–15)
AST SERPL-CCNC: 26 U/L (ref 1–40)
BILIRUB SERPL-MCNC: 0.9 MG/DL (ref 0–1.2)
BUN SERPL-MCNC: 16 MG/DL (ref 8–23)
BUN/CREAT SERPL: 14.2 (ref 7–25)
CALCIUM SPEC-SCNC: 9.4 MG/DL (ref 8.6–10.5)
CHLORIDE SERPL-SCNC: 104 MMOL/L (ref 98–107)
CHOLEST SERPL-MCNC: 81 MG/DL (ref 0–200)
CO2 SERPL-SCNC: 26.8 MMOL/L (ref 22–29)
CREAT SERPL-MCNC: 1.13 MG/DL (ref 0.76–1.27)
GFR SERPL CREATININE-BSD FRML MDRD: 64 ML/MIN/1.73
GLOBULIN UR ELPH-MCNC: 2.8 GM/DL
GLUCOSE SERPL-MCNC: 93 MG/DL (ref 65–99)
HDLC SERPL-MCNC: 42 MG/DL (ref 40–60)
LDLC SERPL CALC-MCNC: 33 MG/DL (ref 0–100)
LDLC/HDLC SERPL: 0.78 {RATIO}
POTASSIUM SERPL-SCNC: 5.2 MMOL/L (ref 3.5–5.2)
PROT SERPL-MCNC: 7.1 G/DL (ref 6–8.5)
SODIUM SERPL-SCNC: 140 MMOL/L (ref 136–145)
TRIGL SERPL-MCNC: 31 MG/DL (ref 0–150)
VLDLC SERPL-MCNC: 6.2 MG/DL (ref 5–40)

## 2020-07-22 PROCEDURE — 36415 COLL VENOUS BLD VENIPUNCTURE: CPT

## 2020-07-22 PROCEDURE — 80061 LIPID PANEL: CPT

## 2020-07-22 PROCEDURE — 80053 COMPREHEN METABOLIC PANEL: CPT

## 2020-07-28 NOTE — TELEPHONE ENCOUNTER
Chief complaint  This is a 37y.o. year old female  who presents with a chief complaint of   Chief Complaint   Patient presents with    Follow-up     asthma       HPI  72-year-old woman with asthma, pulmonary hypertension, tobacco use and obstructive sleep apnea (on CPAP) presents for follow-up. She has gone back to working at [employer] 5 days a week. She denies shortness of breath. She denies cough. She denies nighttime awakenings due to cough or shortness of breath. She using Symbicort twice daily and Atrovent 4 times a day scheduled. She takes Lasix 20 mg daily. She uses CPAP at night. She admits that she is smoking about 10 cigarettes a day. Past medical history:   She was admitted to [facility] from 9/23-9/30/15 for asthma exacerbation. She was treated with steroids, nebulizers, antibiotics and pulmonary toilet. Chest imaging showed atelectasis involving most lobes. There was no evidence of pulmonary embolus. She did have a significant oxygen requirement- needing up to 10L NC. This was eventually weaned to 2L. She was discharged home with this. Supplemental oxygen was discontinued in October 2017. Past Medical History:   Diagnosis Date    Asthma     Atrial fibrillation ([facility] [address]     Conduct disorder          Diabetes mellitus ([facility] [address]     Diabetes mellitus, type 2 ([facility] [address]     Hypothyroidism     Pneumonia     Scoliosis     Scoliosis 1995    Sleep apnea     CPAP       Past Surgical History:   Procedure Laterality Date    APPENDECTOMY      KNEE ARTHROSCOPY Right 07/01/2016    LUNG SURGERY         Current Outpatient Medications   Medication Sig Dispense Refill    ketoconazole (NIZORAL) 2 % shampoo Apply topically once daily until healed.  1 Bottle 2    nystatin-triamcinolone (MYCOLOG) 502211-0.1 UNIT/GM-% ointment Apply topically 2 times daily to breast line and abdomen and then apply powder over the ointment 1 Tube 2    furosemide (LASIX) 20 MG tablet Take 1 tablet by mouth daily Great- let's see if we can keep him in samples for at least 6 months from his PRASANNA date, at that point we could consider switching him to clopidogrel   30 tablet 0    aspirin 81 MG chewable tablet Take 81 mg by mouth daily      loratadine (CLARITIN) 10 MG tablet Take 10 mg by mouth daily      CYCLAFEM 1/35 1-35 MG-MCG per tablet 1 tablet daily      LORazepam (ATIVAN) 0.5 MG tablet 1 tablet every morning (before breakfast)      metoprolol (LOPRESSOR) 25 MG tablet TAKE ONE (1) TABLET BY MOUTH TWICE A DAY FOR TACHYCARDIA 60 tablet 6    ipratropium (ATROVENT HFA) 17 MCG/ACT inhaler Inhale 2 puffs into the lungs 4 times daily.  therapeutic multivitamin-minerals (THERAGRAN-M) tablet Take 1 tablet by mouth daily.  metFORMIN (GLUCOPHAGE) 500 MG tablet Take 500 mg by mouth 2 times daily (with meals).  docusate sodium (COLACE) 100 MG capsule Take 100 mg by mouth daily.  cloZAPine (CLOZARIL) 25 MG tablet Take 25 mg by mouth daily.  topiramate (TOPAMAX) 50 MG tablet Take 50 mg by mouth nightly.  benztropine (COGENTIN) 0.5 MG tablet Take 0.5 mg by mouth daily.  levothyroxine (SYNTHROID) 75 MCG tablet Take 75 mcg by mouth Daily.  Calcium-Vitamin D-Vitamin K (VIACTIV PO) Take 1 tablet by mouth 3 times daily.  divalproex (DEPAKOTE) 500 MG ER tablet Take 1,000 mg by mouth nightly.  budesonide-formoterol (SYMBICORT) 80-4.5 MCG/ACT AERO Inhale 2 puffs into the lungs 2 times daily.  FLUoxetine (PROZAC) 20 MG capsule Take 20 mg by mouth daily. No current facility-administered medications for this visit.         Allergies   Allergen Reactions    Diphenoxylate-Atropine     Lamictal [Lamotrigine]     Pcn [Penicillins]        Family History   Problem Relation Age of Onset    Heart Failure Mother     Depression Father     Emphysema Paternal Grandfather     Liver Disease Maternal Aunt     Diabetes Paternal Grandmother        Social History     Socioeconomic History    Marital status: Single     Spouse name: Not on file    Number of children: Not on file    Years of education: Not on file    Highest education level: Not on file   Occupational History    Not on file   Social Needs    Financial resource strain: Not on file    Food insecurity     Worry: Not on file     Inability: Not on file    Transportation needs     Medical: Not on file     Non-medical: Not on file   Tobacco Use    Smoking status: Current Every Day Smoker     Packs/day: 0.50     Years: 15.00     Pack years: 7.50     Types: Cigarettes     Start date: 1/1/1995    Smokeless tobacco: Never Used    Tobacco comment: 3 packs a week   Substance and Sexual Activity    Alcohol use: No     Alcohol/week: 0.0 standard drinks    Drug use: No    Sexual activity: Not Currently   Lifestyle    Physical activity     Days per week: Not on file     Minutes per session: Not on file    Stress: Not on file   Relationships    Social connections     Talks on phone: Not on file     Gets together: Not on file     Attends Latter-day service: Not on file     Active member of club or organization: Not on file     Attends meetings of clubs or organizations: Not on file     Relationship status: Not on file    Intimate partner violence     Fear of current or ex partner: Not on file     Emotionally abused: Not on file     Physically abused: Not on file     Forced sexual activity: Not on file   Other Topics Concern    Not on file   Social History Narrative    Not on file       Immunization History   Administered Date(s) Administered    Hepatitis B 09/22/2011    Influenza Vaccine, unspecified formulation 11/03/2009, 09/22/2011    Influenza Virus Vaccine 10/29/2013, 10/07/2014, 09/24/2015    Influenza, Quadv, IM, (6 mo and older Fluzone, Flulaval, Fluarix and 3 yrs and older Afluria) 10/26/2016, 10/03/2017    Pneumococcal Polysaccharide ([id number]) 10/27/2015    Td, unspecified formulation 09/22/2011       ROS:  GENERAL:  No fevers, no chills, +5lb weight gain in 6 months   RESPIRATORY:  No shortness of breath, no wheezing      PHYSICAL EXAM:  Gen: Well developed; well nourished  Eyes: No scleral icterus. No conjunctival injection. ENT:  Oropharynx clear. External appearance of ears and nose normal.  Neck: Trachea midline. No obvious mass. No visible thyroid enlargement    Respiratory: Clear to auscultation bilaterally, no accessory muscle use  Cardiovascular: Regular rate and rhythm, no appreciable murmurs. No edema. Gastrointestinal: Soft, non-tender. No hernia  Skin: Warm and dry. No rashes or ulcers on visible areas. Normal texture and turgor  Lymphatic: No cervical LAD. No supraclavicular LAD. Musculoskeletal: No cyanosis, clubbing or joint deformity. Psychiatric: Normal mood and affect; exhibits normal insight and judgement       Data reviewed:  Pulmonary Function Testing (8/31/16)  FVC 2.12 L at 47% predicted ---> 2.4L at 53% predicted  FEV1 1.52 L at 41% predicted ----> 1.77L at 46% predicted  FEV1/FVC ratio at 72% ---->74% predicted  TLC 3.83 L at 64% predicted  VC 2.28L at 52% predicted  ERV 0.43L at 30% predicted  RV/TLC at 40% predicted  DLCO 19.1 at 71% predicted  DLCO/VA 6.52L at 172 % predicted      MIP -30cm H2O  MEP 77cm H2O      Overall: Combined obstructive and restrictive ventilatory defects. The obstruction appears to be mild. There is significant reversal with bronchodilators. Restriction is moderate. Diffusing capacity is mildly reduced. It normalizes when averaged over lung volumes. MIP and MEP are reduced. Compared to the study in March 2016 there has been significant improvement in post-bronchodilator flow measurements and vital capacity has significantly improved.         ABG (3/3/16): 7.38/52/59 (on RA)       Images and reports of chest imaging were reviewed by me. My interpretation is:    Chest CT (11/19/15): small areas of atelectasis in the RML, lingula and LLL; scoliosis causing compression of the left lung; PA-29mm         ECHO (6/4/20)  Summary   Normal LV size & wall thickness;   EF    55-60%.  No wall motion abnormalities   Left ventricular cavity size is normal.   Normal left ventricular wall thickness. Ejection fraction is visually estimated to be 60-65%. Mitral valve leaflets appear mildly thickened. The mitral valve is normal in structure and function. Mild mitral regurgitation. Aortic valve leaflets appear thickened. Mild tricuspid regurgitation, RVSP 28 mmHg, RAP 3 mmHg.        RHC (1/8/16): FINDINGS:    1. Relatively normal pulmonary capillary wedge pressure at 15 mmHg. The    right atrial pressure was slightly elevated at 10 mmHg.    2. Borderline pulmonary hypertension with a pressure of 43/17 and a mean    pulmonary arterial pressure of 29 mmHg. It was difficult for the patient to    cooperate with breathing instructions and there was somewhat of a fluctuation    in the mean pressures. I do think that the mean pressure of 29 mmHg is    accurate.    3. Normal oxygen saturations in the superior vena cava at 75% and right    atrium at 68%. The pulmonary arterial oxygen saturation was 73%.    4. Normal cardiac output by thermodilution at 5.79 liters per minute with a    cardiac index of 2.46 liters per kilogram per minute. By Finas Doniphan the    cardiac output is 7.52 liters per minute with a cardiac index of 3.2 liters    per kilogram per minute. Pulmonary vascular resistance is 193. Lab Results   Component Value Date    WBC 7.7 09/15/2016    HGB 15.2 09/15/2016    HCT 46.1 09/15/2016    MCV 86.4 09/15/2016     09/15/2016       No results found for: BNP    Lab Results   Component Value Date    CREATININE 0.7 01/31/2019    BUN 9 01/31/2019     01/31/2019    K 4.0 01/31/2019     01/31/2019    CO2 27 01/31/2019         Assessment/Plan:37y.o. year old female presents for follow up. Asthma- Continue Symbicort twice daily and Atrovent inhaler 4 times daily scheduled. Pulmonary hypertension- Recent echocardiogram shows continued decrease in RVSP. Continue Lasix 20 mg daily.     Tobacco use- She is not ready to quit. Obstructive sleep apnea- She is on CPAP and follows with Dr. Avelino Spears. She will return for follow-up in 6 months.       Ivelisse Crisostomo MD  [facility] Pulmonology, Critical Care and Sleep

## 2020-08-10 RX ORDER — ATORVASTATIN CALCIUM 40 MG/1
TABLET, FILM COATED ORAL
Qty: 30 TABLET | Refills: 5 | Status: SHIPPED | OUTPATIENT
Start: 2020-08-10 | End: 2021-02-11

## 2021-02-11 RX ORDER — ATORVASTATIN CALCIUM 40 MG/1
TABLET, FILM COATED ORAL
Qty: 90 TABLET | Refills: 3 | Status: SHIPPED | OUTPATIENT
Start: 2021-02-11 | End: 2022-02-23

## 2021-02-13 DIAGNOSIS — E78.6 LOW HDL (UNDER 40): ICD-10-CM

## 2021-02-13 DIAGNOSIS — I25.10 CORONARY ARTERY DISEASE INVOLVING NATIVE CORONARY ARTERY OF NATIVE HEART WITHOUT ANGINA PECTORIS: Chronic | ICD-10-CM

## 2021-02-13 DIAGNOSIS — Z95.5 PRESENCE OF DRUG COATED STENT IN LAD CORONARY ARTERY: ICD-10-CM

## 2021-02-15 RX ORDER — CLOPIDOGREL BISULFATE 75 MG/1
TABLET ORAL
Qty: 90 TABLET | Refills: 3 | Status: SHIPPED | OUTPATIENT
Start: 2021-02-15 | End: 2022-02-14

## 2021-03-19 ENCOUNTER — BULK ORDERING (OUTPATIENT)
Dept: CASE MANAGEMENT | Facility: OTHER | Age: 71
End: 2021-03-19

## 2021-03-19 DIAGNOSIS — Z23 IMMUNIZATION DUE: ICD-10-CM

## 2021-03-24 ENCOUNTER — OFFICE VISIT (OUTPATIENT)
Dept: FAMILY MEDICINE CLINIC | Facility: CLINIC | Age: 71
End: 2021-03-24

## 2021-03-24 VITALS
WEIGHT: 175.2 LBS | TEMPERATURE: 97.3 F | RESPIRATION RATE: 16 BRPM | BODY MASS INDEX: 25.95 KG/M2 | DIASTOLIC BLOOD PRESSURE: 58 MMHG | HEART RATE: 61 BPM | OXYGEN SATURATION: 97 % | HEIGHT: 69 IN | SYSTOLIC BLOOD PRESSURE: 112 MMHG

## 2021-03-24 DIAGNOSIS — Z00.00 MEDICARE ANNUAL WELLNESS VISIT, SUBSEQUENT: Primary | ICD-10-CM

## 2021-03-24 DIAGNOSIS — R35.1 NOCTURIA: ICD-10-CM

## 2021-03-24 PROBLEM — E66.3 OVERWEIGHT WITH BODY MASS INDEX (BMI) OF 25 TO 25.9 IN ADULT: Status: ACTIVE | Noted: 2021-03-24

## 2021-03-24 PROCEDURE — G0439 PPPS, SUBSEQ VISIT: HCPCS | Performed by: FAMILY MEDICINE

## 2021-03-24 NOTE — PROGRESS NOTES
Subsequent Medicare Wellness Visit  The ABC's of Medicare Wellness Visit  Chief Complaint   Patient presents with   • Medicare Wellness-subsequent       Subjective   History of Present Illness      Jama Birch III is a 70 y.o. male who presents   for a Subsequent Medicare Wellness Visit.  Health maintenance topics are up-to-date.  Patient will be due for his second Covid vaccine soon.  He will get his second Shingrix vaccine in June of this year.    Does the patient have evidence of cognitive impairment?   No    Asprin use counseling:On clopidrogel as an alternative (due to ASA contraindication)    Recent Hospitalizations:  No hospitalization(s) within the last year.    Advanced Care Planning:  ACP discussion was held with the patient during this visit. Patient has an advance directive in EMR which is still valid.     The following portions of the patient's history were reviewed   and updated as appropriate: allergies, current medications, past family history, past medical history, past social history, past surgical history and problem list.    Compared to one year ago, the patient feels his   physical health is better.  Compared to one year ago, the patient feels his   mental health is the same.    Reviewed chart for potential of high risk medication in the elderly:  yes  Reviewed chart for potential of harmful drug interactions in the elderly:  yes    Body mass index is 25.95 kg/m².  Discussed the patient's BMI with him.  The BMI is above average; BMI management plan is completed.    HEALTH RISK ASSESSMENT BEGIN  Fall Risk Screen:  STEADI Fall Risk Assessment was completed, and patient is at LOW risk for falls.Assessment completed on:3/24/2021    Depression Screen:   PHQ-2/PHQ-9 Depression Screening 3/24/2021   Little interest or pleasure in doing things 0   Feeling down, depressed, or hopeless 0   Total Score 0       Current Medical Providers:  Patient Care Team:  Jama Moses MD as PCP - General  (Family Medicine)  Alfie Hooks MD as Consulting Physician (General Surgery)  Neri Lin MD as Consulting Physician (Ophthalmology)  Jama Tobin (Dental General Practice)  Vicente Schmitz III, MD as Consulting Physician (Cardiology)  Jama Lopez MD as Consulting Physician (Dermatology)    Smoking Status:  Social History     Tobacco Use   Smoking Status Never Smoker   Smokeless Tobacco Never Used       Alcohol Consumption:  Social History     Substance and Sexual Activity   Alcohol Use Yes   • Alcohol/week: 1.0 standard drinks   • Types: 1 Standard drinks or equivalent per week    Comment: Caffeine use       Health Habits and Functional and Cognitive Screening:  Functional & Cognitive Status 3/24/2021   Do you have difficulty preparing food and eating? No   Do you have difficulty bathing yourself, getting dressed or grooming yourself? No   Do you have difficulty using the toilet? No   Do you have difficulty moving around from place to place? No   Do you have trouble with steps or getting out of a bed or a chair? No   Current Diet Well Balanced Diet   Dental Exam Up to date   Eye Exam Up to date   Exercise (times per week) 7 times per week   Current Exercise Activities Include Aerobics   Do you need help using the phone?  No   Are you deaf or do you have serious difficulty hearing?  No   Do you need help with transportation? No   Do you need help shopping? No   Do you need help preparing meals?  No   Do you need help with housework?  No   Do you need help with laundry? No   Do you need help taking your medications? No   Do you need help managing money? No   Do you ever drive or ride in a car without wearing a seat belt? No   Have you felt unusual stress, anger or loneliness in the last month? No   Who do you live with? Spouse   If you need help, do you have trouble finding someone available to you? No   Have you been bothered in the last four weeks by sexual problems? No   Do you have  "difficulty concentrating, remembering or making decisions? No       Age-appropriate Screening Schedule:  Refer to the list below for future screening recommendations based on patient's age, sex and/or medical conditions. Orders for these recommended tests are listed in the plan section. The patient has been provided with a written plan.    Health Maintenance   Topic Date Due   • LIPID PANEL  07/22/2021   • TDAP/TD VACCINES (2 - Td) 05/09/2026   • COLONOSCOPY  01/13/2027   • INFLUENZA VACCINE  Completed   • ZOSTER VACCINE  Completed     HEALTH RISK ASSESSMENT END    Outpatient Medications Prior to Visit   Medication Sig Dispense Refill   • atorvastatin (LIPITOR) 40 MG tablet TAKE 1 TABLET BY MOUTH EVERY DAY EVERY NIGHT 90 tablet 3   • clopidogrel (PLAVIX) 75 MG tablet TAKE 1 TABLET BY MOUTH EVERY DAY 90 tablet 3     No facility-administered medications prior to visit.       Patient Active Problem List   Diagnosis   • Lipoma of torso   • Nocturia   • History of kidney stones   • Atherosclerosis of both carotid arteries   • Coronary artery disease involving native coronary artery of native heart without angina pectoris   • Low HDL (under 40)   • Presence of drug coated stent in LAD coronary artery   • Actinic keratosis of scalp   • History of non-ST elevation myocardial infarction (NSTEMI)   • Squamous cell carcinoma in situ (SCCIS) of scalp   • Overweight with body mass index (BMI) of 25 to 25.9 in adult       Objective      Vitals:    03/24/21 1350   BP: 112/58   Pulse: 61   Resp: 16   Temp: 97.3 °F (36.3 °C)   TempSrc: Tympanic   SpO2: 97%   Weight: 79.5 kg (175 lb 3.2 oz)   Height: 175 cm (68.9\")   PainSc: 0-No pain       Physical Exam    Result Review :           The data below has been reviewed by Jama Moses MD on 03/24/2021.  Lab Results - Last 18 Months   Lab Units 07/22/20  1019 03/16/20  1113 10/23/19  1258   BUN mg/dL 16  --  15   CREATININE mg/dL 1.13  --  0.93   EGFR IF NONAFRICN AM mL/min/1.73 64  --  " 81   SODIUM mmol/L 140  --  142   POTASSIUM mmol/L 5.2  --  4.6   CHLORIDE mmol/L 104  --  104   CALCIUM mg/dL 9.4  --  9.0   ALBUMIN g/dL 4.30  --  4.50   BILIRUBIN mg/dL 0.9  --  0.6   ALK PHOS U/L 53  --  66   AST (SGOT) U/L 26  --  29   ALT (SGPT) U/L 24  --  33   TRIGLYCERIDES mg/dL 31  --   --    HDL CHOL mg/dL 42  --   --    VLDL CHOL mg/dL 6.2  --   --    LDL CHOL mg/dL 33  --   --    LDL/HDL RATIO  0.78  --   --    WBC 10*3/mm3  --   --  2.98*   RBC 10*6/mm3  --   --  4.52   HEMATOCRIT %  --   --  41.8   MCV fL  --   --  92.5   MCH pg  --   --  31.9   PSA ng/mL  --  0.441  --            Assessment/Plan   Assessment and Plan      Diagnoses and all orders for this visit:    1. Medicare annual wellness visit, subsequent (Primary)  Comments:  Information on calorie counting diet shared in AVS.  Shingrix vaccine when June due to recent Covid vaccine.    2. Nocturia  -     PSA DIAGNOSTIC        Medicare Risks and Personalized Health Plan  CMS Preventative Services Quick Reference  Advance Directive Discussion  Cardiovascular risk  Immunizations Discussed/Encouraged (specific immunizations; Shingrix )  Obesity/Overweight   Prostate Cancer Screening     The above risks/problems have been discussed with the patient.  Pertinent information has been shared with the patient in the   After Visit Summary. Follow up plans and orders are seen below   in the Assessment/Plan Section.    Follow Up   Return in about 1 year (around 3/24/2022) for Medicare Wellness and regular visit, 30 min.     An After Visit Summary and PPPS were given to the patient.

## 2021-03-24 NOTE — PATIENT INSTRUCTIONS

## 2021-03-25 LAB — PSA SERPL-MCNC: 0.44 NG/ML (ref 0–4)

## 2021-04-02 ENCOUNTER — OFFICE VISIT (OUTPATIENT)
Dept: CARDIOLOGY | Facility: CLINIC | Age: 71
End: 2021-04-02

## 2021-04-02 VITALS
BODY MASS INDEX: 25.33 KG/M2 | DIASTOLIC BLOOD PRESSURE: 84 MMHG | SYSTOLIC BLOOD PRESSURE: 116 MMHG | HEART RATE: 64 BPM | WEIGHT: 171 LBS | HEIGHT: 69 IN

## 2021-04-02 DIAGNOSIS — I25.2 HISTORY OF NON-ST ELEVATION MYOCARDIAL INFARCTION (NSTEMI): Chronic | ICD-10-CM

## 2021-04-02 DIAGNOSIS — Z95.5 PRESENCE OF DRUG COATED STENT IN LAD CORONARY ARTERY: Chronic | ICD-10-CM

## 2021-04-02 DIAGNOSIS — I65.23 ATHEROSCLEROSIS OF BOTH CAROTID ARTERIES: Primary | Chronic | ICD-10-CM

## 2021-04-02 DIAGNOSIS — I25.10 CORONARY ARTERY DISEASE INVOLVING NATIVE CORONARY ARTERY OF NATIVE HEART WITHOUT ANGINA PECTORIS: Chronic | ICD-10-CM

## 2021-04-02 DIAGNOSIS — E78.6 LOW HDL (UNDER 40): Chronic | ICD-10-CM

## 2021-04-02 PROCEDURE — 99214 OFFICE O/P EST MOD 30 MIN: CPT | Performed by: INTERNAL MEDICINE

## 2021-04-02 PROCEDURE — 93000 ELECTROCARDIOGRAM COMPLETE: CPT | Performed by: INTERNAL MEDICINE

## 2021-04-02 RX ORDER — LORATADINE 10 MG/1
10 TABLET ORAL DAILY
COMMUNITY

## 2021-04-02 NOTE — PROGRESS NOTES
Subjective:     Encounter Date: 04/02/21        Patient ID: Jama Birch III is a 70 y.o. male.    Chief Complaint: CAD  History of Present Illness    Dear Dr. Moses,    I had the pleasure of seeing this patient in the office today in follow-up of his CAD.    He comes in today for 1 year follow-up.  He has been doing great without any complaints.  He denies any chest pain, pressure, tightness, squeezing, or heartburn.  He has not experienced any feeling of palpitations, tachycardia or heart racing and no presyncope or syncope.  There has not been any problems with dizziness or lightheadedness.  There has not been any orthopnea or PND, and no problems with lower extremity edema.  He denies any shortness of breath at rest or with activity and has not had any wheezing.  He has not had any problems with unexplained nausea or vomiting. He has continued to perform daily activities of living without any specific problem or change in the level of activity.  He has not been recently hospitalized for any reason.    Stress test October 2019:  Interpretation Summary     · Myocardial perfusion imaging indicates a normal myocardial perfusion study with no evidence of ischemia.  · Left ventricular ejection fraction is normal (Calculated EF = 64%).  · Impressions are consistent with a low risk study.  · There is no prior study available for comparison.         As you know he initially presented with chest discomfort to [facility].  Initial troponin was elevated 0.3.  He was admitted to the hospital, treated with oral and parenteral anti-ischemic therapy, and then had a cardiac catheterization that afternoon that demonstrated complete occlusion of the LAD in the mid segment.  This was then treated with a drug-eluting stent.    Patient states that since going home he has had complete resolution of the angina pectoris that he was experiencing beforehand.  He is back to doing about 8000 steps a day  and is interested in increasing his activity back to what he was doing before.  He is not having chest pain or chest discomfort.  No shortness of breath with activity.  No tachycardia or palpitations.  Cath 3/2019:  Hemodynamics:   LV:103/4  AO: 103/62        PCI CORONARY SEGMENT: Mid LAD  PRE-STENOSIS:100  POST-STENOSIS: 0%  LESION TYPE: c  SHILOH FLOW PRE/POST: 0/3  CULPRIT LESION: Yes     Estimated blood loss: Minimal  Complications: None     Conclusions:   1. Left main: Normal  2. LAD: Acutely occluded in the midsegment  3. LCX: Dominant vessel.  Normal.  4. RCA: Small caliber nondominant vessel.  Normal.  5.  Normal left ventricular size.  Mildly reduced systolic function.  Mild anterolateral hypokinesis.  Severe mid to distal inferior wall hypokinesis  7.  PCI of the proximal mid LAD with a 3.5 x 28 mm Xience CRI drug-eluting stent     Recommendations dual antiplatelet therapy for 1 year       The following portions of the patient's history were reviewed and updated as appropriate: allergies, current medications, past family history, past medical history, past social history, past surgical history and problem list.    Past Medical History:   Diagnosis Date   • Abnormal CT scan; splenic flexure thickening 6/16/2016   • Atherosclerosis of both carotid arteries    • Coronary artery disease involving native coronary artery of native heart without angina pectoris 4/4/2019   • History of non-ST elevation myocardial infarction (NSTEMI) 2/17/2020       Past Surgical History:   Procedure Laterality Date   • CARDIAC CATHETERIZATION N/A 3/26/2019    Procedure: Left Heart Cath;  Surgeon: Sachin Barrera MD;  Location: [facility] CATH INVASIVE LOCATION;  Service: Cardiology   • CARDIAC CATHETERIZATION N/A 3/26/2019    Procedure: Left ventriculography;  Surgeon: Sachin Barrera MD;  Location: [facility] CATH INVASIVE LOCATION;  Service: Cardiology   • CARDIAC CATHETERIZATION N/A 3/26/2019    Procedure: Coronary  "angiography;  Surgeon: Sachin Barrera MD;  Location:  [facility] CATH INVASIVE LOCATION;  Service: Cardiology   • CARDIAC CATHETERIZATION N/A 3/26/2019    Procedure: Stent PRASANNA coronary;  Surgeon: Sachin Barrera MD;  [facility];  Service: Cardiology   • CORONARY ANGIOPLASTY WITH STENT PLACEMENT  03/26/2019    LAD stent   • SKIN CANCER EXCISION  2019    scalp       Social History     Socioeconomic History   • Marital status:      Spouse name: Tracey   • Number of children: 3   • Years of education: Not on file   • Highest education level: Not on file   Tobacco Use   • Smoking status: Never Smoker   • Smokeless tobacco: Never Used   Substance and Sexual Activity   • Alcohol use: Yes     Alcohol/week: 1.0 standard drinks     Types: 1 Standard drinks or equivalent per week     Comment: Caffeine use   • Drug use: No   • Sexual activity: Yes     Partners: Female           ECG 12 Lead    Date/Time: 4/2/2021 1:38 PM  Performed by: Vicente Schmitz III, MD  Authorized by: Vicente Schmitz III, MD   Comparison: compared with previous ECG   Similar to previous ECG  Rhythm: sinus rhythm  Rate: normal  Conduction: conduction normal  ST Segments: ST segments normal  T Waves: T waves normal  QRS axis: normal  Other: no other findings    Clinical impression: normal ECG               Objective:     Vitals:    04/02/21 1321   BP: 116/84   Pulse: 64   Weight: 77.6 kg (171 lb)   Height: 175 cm (68.9\")         Physical Exam  Constitutional:       General: He is not in acute distress.     Appearance: He is well-developed. He is not diaphoretic.   HENT:      Head: Normocephalic and atraumatic.      Nose: Nose normal.   Eyes:      General:         Right eye: No discharge.         Left eye: No discharge.      Conjunctiva/sclera: Conjunctivae normal.      Pupils: Pupils are equal, round, and reactive to light.   Neck:      Thyroid: No thyromegaly.      Trachea: No tracheal deviation. "   Cardiovascular:      Rate and Rhythm: Normal rate and regular rhythm.      Pulses: Normal pulses.      Heart sounds: Normal heart sounds, S1 normal and S2 normal. No S3 sounds.    Pulmonary:      Effort: Pulmonary effort is normal. No respiratory distress.      Breath sounds: Normal breath sounds. No stridor.   Chest:      Chest wall: No tenderness.   Abdominal:      General: Bowel sounds are normal. There is no distension.      Palpations: Abdomen is soft. There is no mass.      Tenderness: There is no abdominal tenderness. There is no guarding or rebound.   Musculoskeletal:         General: No tenderness or deformity. Normal range of motion.      Cervical back: Normal range of motion and neck supple.   Lymphadenopathy:      Cervical: No cervical adenopathy.   Skin:     General: Skin is warm and dry.      Findings: No erythema or rash.   Neurological:      Mental Status: He is alert and oriented to person, place, and time.      Deep Tendon Reflexes: Reflexes are normal and symmetric.   Psychiatric:         Thought Content: Thought content normal.         Lab Review:           Lab Results   Component Value Date    GLUCOSE 93 07/22/2020    BUN 16 07/22/2020    CREATININE 1.13 07/22/2020    EGFRIFNONA 64 07/22/2020    EGFRIFAFRI 98 06/24/2019    BCR 14.2 07/22/2020    K 5.2 07/22/2020    CO2 26.8 07/22/2020    CALCIUM 9.4 07/22/2020    PROTENTOTREF 6.6 06/24/2019    ALBUMIN 4.30 07/22/2020    LABIL2 1.4 06/24/2019    AST 26 07/22/2020    ALT 24 07/22/2020     Lab Results   Component Value Date    CHOL 81 07/22/2020    CHOL 122 03/27/2019     Lab Results   Component Value Date    TRIG 31 07/22/2020    TRIG 43 06/24/2019    TRIG 109 03/27/2019     Lab Results   Component Value Date    HDL 42 07/22/2020    HDL 30 (L) 03/27/2019     Lab Results   Component Value Date    LDL 33 07/22/2020    LDL 70 03/27/2019     Lab Results   Component Value Date    VLDL 6.2 07/22/2020    VLDL 21.8 03/27/2019     Lab Results   Component  Value Date    LDLHDL 0.78 07/22/2020    LDLHDL 2.34 03/27/2019       Results for orders placed during the hospital encounter of 11/11/19    Adult Transthoracic Echo Complete W/ Cont if Necessary Per Protocol    Interpretation Summary  · Left ventricular systolic function is normal. Calculated EF = 61%. Estimated EF was in agreement with the calculated EF. Estimated EF = 61%. Normal left ventricular cavity size and wall thickness noted. All left ventricular wall segments contract normally. Left ventricular diastolic function is normal.  · Left atrial volume is mildly increased. Saline test results are negative.  · There is moderate thickening of the noncoronary cusp of the aortic valve.  · Moderate MAC is present. Mild mitral valve regurgitation is present.  · Physiologic tricuspid valve regurgitation is present. Insufficient TR velocity profile to estimate the right ventricular systolic pressure.            Assessment:          Diagnosis Plan   1. Atherosclerosis of both carotid arteries  ECG 12 Lead   2. Coronary artery disease involving native coronary artery of native heart without angina pectoris  ECG 12 Lead   3. Low HDL (under 40)  ECG 12 Lead   4. Presence of drug coated stent in LAD coronary artery  ECG 12 Lead   5. History of non-ST elevation myocardial infarction (NSTEMI)  ECG 12 Lead          Plan:       1. Coronary Artery Disease  Assessment  • The patient has no angina    Plan  • Lifestyle modifications discussed include adhering to a heart healthy diet, avoidance of tobacco products, maintenance of a healthy weight, medication compliance, regular exercise and regular monitoring of cholesterol and blood pressure  • Doing well, continue clopidogrel    Subjective - Objective  • There has been a previous stent procedure using PRASANNA  • Current antiplatelet therapy includes aspirin 81 mg and ticagrelor 90 mg  • The patient qualifies for cardiac rehabilitation, and has completed a cardiac rehab program      2.   Mixed hyperlipidemia, continue atorvastatin, LDL, HDL, AST and ALT are reviewed.  Thank you very much for allowing us to participate in the care of this pleasant patient.  Please don't hesitate to call if I can be of assistance in any way.      Current Outpatient Medications:   •  atorvastatin (LIPITOR) 40 MG tablet, TAKE 1 TABLET BY MOUTH EVERY DAY EVERY NIGHT, Disp: 90 tablet, Rfl: 3  •  clopidogrel (PLAVIX) 75 MG tablet, TAKE 1 TABLET BY MOUTH EVERY DAY, Disp: 90 tablet, Rfl: 3  •  loratadine (CLARITIN) 10 MG tablet, Take 10 mg by mouth Daily., Disp: , Rfl:

## 2022-02-11 DIAGNOSIS — E78.6 LOW HDL (UNDER 40): ICD-10-CM

## 2022-02-11 DIAGNOSIS — Z95.5 PRESENCE OF DRUG COATED STENT IN LAD CORONARY ARTERY: ICD-10-CM

## 2022-02-11 DIAGNOSIS — I25.10 CORONARY ARTERY DISEASE INVOLVING NATIVE CORONARY ARTERY OF NATIVE HEART WITHOUT ANGINA PECTORIS: Chronic | ICD-10-CM

## 2022-02-14 RX ORDER — CLOPIDOGREL BISULFATE 75 MG/1
TABLET ORAL
Qty: 90 TABLET | Refills: 3 | Status: SHIPPED | OUTPATIENT
Start: 2022-02-14 | End: 2023-02-06

## 2022-02-15 DIAGNOSIS — E78.2 MIXED HYPERLIPIDEMIA: Primary | ICD-10-CM

## 2022-02-22 ENCOUNTER — LAB (OUTPATIENT)
Dept: LAB | Facility: HOSPITAL | Age: 72
End: 2022-02-22

## 2022-02-22 DIAGNOSIS — E78.2 MIXED HYPERLIPIDEMIA: ICD-10-CM

## 2022-02-22 LAB
ALBUMIN SERPL-MCNC: 4.1 G/DL (ref 3.5–5.2)
ALBUMIN/GLOB SERPL: 1.5 G/DL
ALP SERPL-CCNC: 66 U/L (ref 39–117)
ALT SERPL W P-5'-P-CCNC: 19 U/L (ref 1–41)
ANION GAP SERPL CALCULATED.3IONS-SCNC: 6 MMOL/L (ref 5–15)
AST SERPL-CCNC: 20 U/L (ref 1–40)
BILIRUB SERPL-MCNC: 0.4 MG/DL (ref 0–1.2)
BUN SERPL-MCNC: 12 MG/DL (ref 8–23)
BUN/CREAT SERPL: 13.5 (ref 7–25)
CALCIUM SPEC-SCNC: 8.6 MG/DL (ref 8.6–10.5)
CHLORIDE SERPL-SCNC: 108 MMOL/L (ref 98–107)
CHOLEST SERPL-MCNC: 88 MG/DL (ref 0–200)
CO2 SERPL-SCNC: 26 MMOL/L (ref 22–29)
CREAT SERPL-MCNC: 0.89 MG/DL (ref 0.76–1.27)
GFR SERPL CREATININE-BSD FRML MDRD: 85 ML/MIN/1.73
GLOBULIN UR ELPH-MCNC: 2.8 GM/DL
GLUCOSE SERPL-MCNC: 93 MG/DL (ref 65–99)
HDLC SERPL-MCNC: 44 MG/DL (ref 40–60)
LDLC SERPL CALC-MCNC: 31 MG/DL (ref 0–100)
LDLC/HDLC SERPL: 0.76 {RATIO}
POTASSIUM SERPL-SCNC: 4 MMOL/L (ref 3.5–5.2)
PROT SERPL-MCNC: 6.9 G/DL (ref 6–8.5)
SODIUM SERPL-SCNC: 140 MMOL/L (ref 136–145)
TRIGL SERPL-MCNC: 52 MG/DL (ref 0–150)
VLDLC SERPL-MCNC: 13 MG/DL (ref 5–40)

## 2022-02-22 PROCEDURE — 80061 LIPID PANEL: CPT

## 2022-02-22 PROCEDURE — 36415 COLL VENOUS BLD VENIPUNCTURE: CPT

## 2022-02-22 PROCEDURE — 80053 COMPREHEN METABOLIC PANEL: CPT

## 2022-02-23 RX ORDER — ATORVASTATIN CALCIUM 40 MG/1
TABLET, FILM COATED ORAL
Qty: 90 TABLET | Refills: 3 | Status: SHIPPED | OUTPATIENT
Start: 2022-02-23 | End: 2022-05-10

## 2022-03-18 ENCOUNTER — TELEMEDICINE (OUTPATIENT)
Dept: FAMILY MEDICINE CLINIC | Facility: CLINIC | Age: 72
End: 2022-03-18

## 2022-03-18 ENCOUNTER — TELEPHONE (OUTPATIENT)
Dept: FAMILY MEDICINE CLINIC | Facility: CLINIC | Age: 72
End: 2022-03-18

## 2022-03-18 DIAGNOSIS — R05.9 COUGH: ICD-10-CM

## 2022-03-18 DIAGNOSIS — U07.1 COVID-19: Primary | ICD-10-CM

## 2022-03-18 PROCEDURE — 99213 OFFICE O/P EST LOW 20 MIN: CPT | Performed by: NURSE PRACTITIONER

## 2022-03-18 NOTE — PROGRESS NOTES
"Chief Complaint  Covid-19 Home Monitoring Video Visit (/Cough, congestion, fatigue, loss of taste)    Subjective          MARCI presents to [facility]    You have chosen to receive care through a telehealth visit.  Do you consent to use a video/audio connection for your medical care today? Yes    71 year old male presented to the clinic c/o testing positive for Covid 19 today at the [facility].  He has had a low grade fever t max 99.4 non productive cough, nasal congestion, loss of taste and fatigue for the past couple of days.  He is able to eat and keep fluids down.  He is not having any problems with sob.  NO abd pain, no N/V.  He did have a couple of episodes of diarrhea.  NO one else around him has been sick.  He is taking otc cold medication and delsym cough.      He has no other c/o today.    The following portions of the patient's history were reviewed and updated as appropriate: allergies, current medications, past family history, past medical history, past social history, past surgical history, and problem list    Review of Systems   Constitutional: Positive for fatigue and fever. Negative for chills.   HENT: Positive for congestion and postnasal drip. Negative for ear pain and sore throat.    Eyes: Negative for visual disturbance.   Respiratory: Positive for cough. Negative for shortness of breath and wheezing.    Cardiovascular: Negative for chest pain, palpitations and leg swelling.   Gastrointestinal: Positive for diarrhea. Negative for abdominal distention, abdominal pain, nausea and vomiting.   Skin: Negative for rash.   Neurological: Negative for dizziness and light-headedness.        Objective   Vital Signs: unable to obtain  tmax 99.4  Height 69\" weight 171  bmi 25.3  There were no vitals filed for this visit.     Virtual  Physical Exam  Constitutional:       General: He is not in acute distress.     Appearance: He is normal weight.   HENT:      Head: Normocephalic. "   Pulmonary:      Effort: Pulmonary effort is normal. No respiratory distress.   Neurological:      Mental Status: He is alert and oriented to person, place, and time.   Psychiatric:         Mood and Affect: Mood normal.         Behavior: Behavior normal.         Thought Content: Thought content normal.         Judgment: Judgment normal.          Result Review :     The following data was reviewed by: ILDEFONSO Coyle on 03/18/2022:  Covid 19/Sars Positive today 3/18/2022  TLC         Assessment and Plan    Diagnoses and all orders for this visit:    1. COVID-19 (Primary)  Comments:  positive 3/18/2022    Plan:  -Take all medications as prescribed and until completed.  -Covid test was positive on 03/18/2022  -Monitor for fever and take Tylenol as needed.  Drink plenty of fluids and get plenty of rest.  -Use cool-mist humidifier as needed.  -Seek immediate medical attention for fever unrelieved by Tylenol, chest pain, shortness of breath, sharp back pain, or any other worsening signs or symptoms.  -Remain in quarantine until 10 days from symptom onset.  -The patient verbalized understanding of all instructions given today.     I would recommend an over-the-counter vitamin regimen to boost your immune system of the following: Vitamin D3 5,000 IU daily, vitamin C 500-1,000 mg twice daily, Quercetin 250 mg twice daily, Zinc 100 mg/day, and Melatonin 5-10 mg before bedtime.  You can purchase a pulse oximeter at any local pharmacy to monitor your oxygen saturations.  Call 911 if you have shortness of breath, sharp chest pain, sharp pain in your back, or a fever that will not come down by Tylenol      2. Cough  Comments:  increase fluid intake  continue otc cough medication  check b/p regularly      This was an audio and video enabled telemedicine encounter.  Video visit lasted  approx 13 minutes  Follow Up   Return if symptoms worsen or fail to improve.  Patient was given instructions and counseling regarding his  condition or for health maintenance advice. Please see specific information pulled into the AVS if appropriate.

## 2022-03-18 NOTE — TELEPHONE ENCOUNTER
Caller: Jama Birch III    Relationship to patient: Self    Best call back number: [phone]    Date of positive COVID19 test: 03/18    COVID19 symptoms: COUGH, CONGESTION, FEVER, DIARRHEA , NO TASTE, FATIGUE     Date of initial quarantine: 03/18        What is the patients preferred pharmacy: [facility]/pharmacy #[id number] - [place], [place] - [address] - [phone]  - [phone]

## 2022-04-13 ENCOUNTER — OFFICE VISIT (OUTPATIENT)
Dept: CARDIOLOGY | Facility: CLINIC | Age: 72
End: 2022-04-13

## 2022-04-13 VITALS
SYSTOLIC BLOOD PRESSURE: 108 MMHG | HEIGHT: 68 IN | DIASTOLIC BLOOD PRESSURE: 62 MMHG | BODY MASS INDEX: 26 KG/M2 | HEART RATE: 80 BPM

## 2022-04-13 DIAGNOSIS — I25.10 CORONARY ARTERY DISEASE INVOLVING NATIVE CORONARY ARTERY OF NATIVE HEART WITHOUT ANGINA PECTORIS: Primary | Chronic | ICD-10-CM

## 2022-04-13 DIAGNOSIS — R14.2 BELCHING: ICD-10-CM

## 2022-04-13 DIAGNOSIS — E78.2 MIXED HYPERLIPIDEMIA: ICD-10-CM

## 2022-04-13 DIAGNOSIS — N52.9 ERECTILE DYSFUNCTION, UNSPECIFIED ERECTILE DYSFUNCTION TYPE: ICD-10-CM

## 2022-04-13 DIAGNOSIS — R20.0 BILATERAL HAND NUMBNESS: ICD-10-CM

## 2022-04-13 DIAGNOSIS — I65.21 CAROTID ARTERY PLAQUE, RIGHT: ICD-10-CM

## 2022-04-13 PROBLEM — I65.23 ATHEROSCLEROSIS OF BOTH CAROTID ARTERIES: Chronic | Status: RESOLVED | Noted: 2019-03-25 | Resolved: 2022-04-13

## 2022-04-13 PROCEDURE — 93000 ELECTROCARDIOGRAM COMPLETE: CPT | Performed by: NURSE PRACTITIONER

## 2022-04-13 PROCEDURE — 99214 OFFICE O/P EST MOD 30 MIN: CPT | Performed by: NURSE PRACTITIONER

## 2022-04-13 NOTE — PROGRESS NOTES
Date of Office Visit: 2022  Encounter Provider: ILDEFONSO Dennison  Place of Service: [facility] CARDIOLOGY  Patient Name: Jama Birch III  :1950  Primary Cardiologist: Dr. Vicente Schmitz     Chief Complaint   Patient presents with   • Coronary Artery Disease   • Annual Exam   :     HPI: Jama Birch III is a pleasant 71 y.o. male who presents today for annual follow up on coronary artery disease. I have reviewed his medical records.     In 2019, he was diagnosed with coronary artery disease and underwent drug eluting stent placement to the LAD.  Echocardiogram at that time showed normal LVEF, saline test negative, moderate thickening of the noncoronary cusp of the aortic valve, moderate mitral annular calcification, mild mitral regurgitation, and physiologic tricuspid regurgitation.  In 2019, vascular screening showed right carotid plaque.    He presents today for his annual follow-up visit.  Over the past 5 months he has noticed belching.  About a month ago he had 2 episodes of chest fullness for about a minute at rest.  He has noticed sometimes his hands fall asleep at nighttime.  Since January, his erectile dysfunction has worsened.  He denies chest pain, shortness of air, edema, or syncope.  Occasionally he experiences a brief fluttering sensation, nothing sustained and occasional dizziness.  Blood pressure and heart rate are normal today.    I reviewed his blood work from 2022: Lipid panel showed total cholesterol of 88, triglycerides 52, HDL 44, and LDL 31.  CMP normal except for chloride of 108.      Past Medical History:   Diagnosis Date   • Abnormal CT scan; splenic flexure thickening 2016   • Atherosclerosis of both carotid arteries    • Cancer (HCC) 2019   • Coronary artery disease involving native coronary artery of native heart without angina pectoris 2019   • History of non-ST elevation myocardial  infarction (NSTEMI) 02/17/2020   • Myocardial infarction (HCC) 3/26/2019       Past Surgical History:   Procedure Laterality Date   • CARDIAC CATHETERIZATION N/A 3/26/2019    Procedure: Left Heart Cath;  Surgeon: Sachin Barrera MD;  Location:  [facility];  Service: Cardiology   • CARDIAC CATHETERIZATION N/A 3/26/2019    Procedure: Left ventriculography;  Surgeon: Sachin Barrera MD;  Location:  [facility];  Service: Cardiology   • CARDIAC CATHETERIZATION N/A 3/26/2019    Procedure: Coronary angiography;  Surgeon: Sachin Barrera MD;  Location:  [facility];  Service: Cardiology   • CARDIAC CATHETERIZATION N/A 3/26/2019    Procedure: Stent PRASANNA coronary;  Surgeon: Sachin Barrera MD;  Location:  [facility];  Service: Cardiology   • CORONARY ANGIOPLASTY WITH STENT PLACEMENT  03/26/2019    LAD stent   • SKIN CANCER EXCISION  2019    scalp       Social History     Socioeconomic History   • Marital status:      Spouse name: Tracey   • Number of children: 3   Tobacco Use   • Smoking status: Never Smoker   • Smokeless tobacco: Never Used   Substance and Sexual Activity   • Alcohol use: Yes     Comment: Caffeine use: daIly   • Drug use: No   • Sexual activity: Yes     Partners: Female     Birth control/protection: Other       Family History   Problem Relation Age of Onset   • Other Mother         brain tumor, meningioma   • Lung cancer Father 48        heavy smoker   • Diabetes Maternal Aunt    • Diabetes Maternal Uncle    • Hypertension Maternal Uncle    • Heart disease Paternal Grandfather    • Heart attack Maternal Grandmother    • Heart attack Paternal Grandmother 68   • Parkinsonism Maternal Uncle    • Diabetes Maternal Aunt        The following portion of the patient's history were reviewed and updated as appropriate: past medical history, past surgical history, past social history, past family history, allergies,  "current medications, and problem list.    Review of Systems   Constitutional: Negative.   Cardiovascular: Positive for palpitations.   Respiratory: Negative.    Hematologic/Lymphatic: Negative.    Gastrointestinal:        Belching   Genitourinary:        Erectile dysfunction   Neurological: Positive for dizziness and numbness.       No Known Allergies      Current Outpatient Medications:   •  atorvastatin (LIPITOR) 40 MG tablet, TAKE 1 TABLET BY MOUTH EVERY DAY EVERY NIGHT, Disp: 90 tablet, Rfl: 3  •  clopidogrel (PLAVIX) 75 MG tablet, TAKE 1 TABLET BY MOUTH EVERY DAY, Disp: 90 tablet, Rfl: 3  •  loratadine (CLARITIN) 10 MG tablet, Take 10 mg by mouth Daily., Disp: , Rfl:         Objective:     Vitals:    04/13/22 0933 04/13/22 0941   BP: 104/64 108/62   BP Location: Left arm Right arm   Pulse: 80    Height: 172.7 cm (68\")      Body mass index is 26 kg/m².    PHYSICAL EXAM:    Vitals Reviewed.   General Appearance: No acute distress, well developed and well nourished.    Eyes: Conjunctiva and lids: No erythema, swelling, or discharge. Sclera non-icteric. Glasses.   HENT: Atraumatic, normocephalic. External eyes, ears, and nose normal. No hearing loss noted. Mucous membranes normal. Lips not cyanotic. Neck supple with no tenderness. Wearing mask.   Respiratory: No signs of respiratory distress. Respiration rhythm and depth normal.   Clear to auscultation. No rales, crackles, rhonchi, or wheezing auscultated.   Cardiovascular:  Jugular Venous Pressure: Normal  Heart Rate and Rhythm: Normal, Heart Sounds: Normal S1 and S2. No S3 or S4 noted.  Murmurs: No murmurs noted. No rubs, thrills, or gallops.   Lower Extremities: No edema noted.  Gastrointestinal:  Abdomen soft, non-distended, non-tender.    Musculoskeletal: Normal movement of extremities.  Skin and Nails: General appearance normal. No pallor, cyanosis, diaphoresis. Skin temperature normal. No clubbing of fingernails.   Psychiatric: Patient alert and oriented to " person, place, and time. Speech and behavior appropriate. Normal mood and affect.       ECG 12 Lead    Date/Time: 4/13/2022 9:37 AM  Performed by: Massiel Caballero APRN  Authorized by: Massiel Caballreo APRN   Comparison: compared with previous ECG from 4/2/2021  Similar to previous ECG  Rhythm: sinus rhythm  Rate: normal  BPM: 80  Conduction: conduction normal  ST Segments: ST segments normal  T Waves: T waves normal  QRS axis: right  Other: no other findings    Clinical impression: normal ECG              Assessment:       Diagnosis Plan   1. Coronary artery disease involving native coronary artery of native heart without angina pectoris     2. Carotid artery plaque, right     3. Mixed hyperlipidemia     4. Belching     5. Erectile dysfunction, unspecified erectile dysfunction type     6. Bilateral hand numbness            Plan:       1.  Coronary artery Disease: Status post stent placement to the LAD in 2019.  His anginal symptoms at that time were left arm pain, shortness of breath, chest pain, and headache.  He has recently been experiencing belching and 2 brief episodes of a chest fullness.  He exercises and denies exertional symptoms.  I offered a stress test, and at this time he just wants to hold off.  He will follow-up with his PCP about the other symptoms.    2.  Carotid Artery Plaque: Noted per vascular screening in 2019.    3.  Mixed Hyperlipidemia: Recent cholesterol panel was stable.  Continue atorvastatin.    4/5/6.  Belching, Erectile Dysfunction, and hands falling asleep: I think the belching is most likely GI related.  I told him it would be fine to take ED medications from a cardiac standpoint as long as he does not have nitroglycerin within 24 hours of taking the medications.  I think his hands falling asleep at nighttime is most likely related to the neck stiffness that he reported.  I recommend evaluation by his PCP.    7.  If he wants to proceed with a stress test, asked him to call me at  the office and I can get it ordered.  Otherwise, he wants to follow-up with Dr. Vicente Schmitz in 1 year.      As always, it has been a pleasure to participate in your patient's care. Thank you.       Sincerely,         ILDEFONSO Duffy  [facility] Cardiology      · Dictated utilizing Dragon Dictation  · COVID-19 Precautions - Patient was compliant in wearing a mask. When I saw the patient, I used appropriate personal protective equipment (PPE) including mask and eye shield (standard procedure).  Additionally, I used gown and gloves if indicated.  Hand hygiene was completed before and after seeing the patient.  · I spent35  minutes reviewing her medical records/testing/previous office notes/labs, face-to-face interaction with patient, physical examination, formulating the plan of care, and discussion of plan of care with patient.

## 2022-04-25 ENCOUNTER — OFFICE VISIT (OUTPATIENT)
Dept: FAMILY MEDICINE CLINIC | Facility: CLINIC | Age: 72
End: 2022-04-25

## 2022-04-25 VITALS
OXYGEN SATURATION: 98 % | HEIGHT: 69 IN | SYSTOLIC BLOOD PRESSURE: 142 MMHG | DIASTOLIC BLOOD PRESSURE: 76 MMHG | WEIGHT: 170 LBS | TEMPERATURE: 98 F | BODY MASS INDEX: 25.18 KG/M2 | RESPIRATION RATE: 16 BRPM | HEART RATE: 61 BPM

## 2022-04-25 DIAGNOSIS — N52.8 OTHER MALE ERECTILE DYSFUNCTION: Primary | ICD-10-CM

## 2022-04-25 PROCEDURE — 99213 OFFICE O/P EST LOW 20 MIN: CPT | Performed by: FAMILY MEDICINE

## 2022-04-25 RX ORDER — SILDENAFIL CITRATE 20 MG/1
TABLET ORAL
Qty: 50 TABLET | Refills: 11 | Status: SHIPPED | OUTPATIENT
Start: 2022-04-25

## 2022-04-25 NOTE — PROGRESS NOTES
"Chief Complaint  Erectile Dysfunction    Subjective          MARCI presents to [facility]  With ED symptoms over the past 3 weeks.  Libido intact.  Some morning erections still reported.  No painful erections reported.  Very interested in continuing with intimacy.  Did talk about consideration of lubricants for his wife.        Objective   Vital Signs:   Vitals:    04/25/22 1622   BP: 142/76   Pulse: 61   Resp: 16   Temp: 98 °F (36.7 °C)   TempSrc: Skin   SpO2: 98%   Weight: 77.1 kg (170 lb)   Height: 175.3 cm (69\")                Physical Exam  Genitourinary:     Penis: Normal and circumcised.       Testes: Normal.   Neurological:      Mental Status: He is alert.          Result Review :                Assessment and Plan    Diagnoses and all orders for this visit:    1. Other male erectile dysfunction (Primary)  Assessment & Plan:  New problem.  3 weeks onset.  Libido intact.  Trial tadalafil 10 mg as needed dosing    Orders:  -     sildenafil (REVATIO) 20 MG tablet; Take 3-5 tablets  po 60 minutes prior to sexual activity with a light meal as needed  Dispense: 50 tablet; Refill: 11      Follow Up   Return if symptoms worsen or fail to improve.  Patient was given instructions and counseling regarding his condition or for health maintenance advice. Please see specific information pulled into the AVS if appropriate.   "

## 2022-05-05 ENCOUNTER — PRIOR AUTHORIZATION (OUTPATIENT)
Dept: FAMILY MEDICINE CLINIC | Facility: CLINIC | Age: 72
End: 2022-05-05

## 2022-05-10 RX ORDER — ATORVASTATIN CALCIUM 40 MG/1
TABLET, FILM COATED ORAL
Qty: 90 TABLET | Refills: 3 | Status: SHIPPED | OUTPATIENT
Start: 2022-05-10

## 2022-05-13 ENCOUNTER — TELEPHONE (OUTPATIENT)
Dept: FAMILY MEDICINE CLINIC | Facility: CLINIC | Age: 72
End: 2022-05-13

## 2022-05-13 NOTE — TELEPHONE ENCOUNTER
I called the patient back and explained that insurance wont cover this medication, he should use [facility].  He is aware of how to do this going forward.     Lizbeth

## 2022-05-13 NOTE — TELEPHONE ENCOUNTER
PATIENT IS CALLING TO FIND OUT THE STATUS OF sildenafil (REVATIO) 20 MG tablet. IT LOOKS LIKE A PA WAS STARTED FOR THIS ON 5/5/22. PLEASE ADVISE OF STATUS: [id number] PATIENT WOULD LIKE A VM IF THEY CANNOT REACH HIM

## 2022-08-29 ENCOUNTER — TELEPHONE (OUTPATIENT)
Dept: CARDIOLOGY | Facility: CLINIC | Age: 72
End: 2022-08-29

## 2022-08-29 NOTE — TELEPHONE ENCOUNTER
Patient of Dr. Schmitz sent to my voicemail.    He is wondering if he needs an abx prior to dental visits.    He is s/p valente stent placement March 2019.    Patient can be reached at [phone].

## 2022-09-07 ENCOUNTER — OFFICE VISIT (OUTPATIENT)
Dept: FAMILY MEDICINE CLINIC | Facility: CLINIC | Age: 72
End: 2022-09-07

## 2022-09-07 VITALS
SYSTOLIC BLOOD PRESSURE: 100 MMHG | RESPIRATION RATE: 18 BRPM | WEIGHT: 177 LBS | BODY MASS INDEX: 26.22 KG/M2 | HEIGHT: 69 IN | OXYGEN SATURATION: 97 % | HEART RATE: 73 BPM | TEMPERATURE: 97 F | DIASTOLIC BLOOD PRESSURE: 70 MMHG

## 2022-09-07 DIAGNOSIS — Z00.00 MEDICARE ANNUAL WELLNESS VISIT, SUBSEQUENT: Primary | ICD-10-CM

## 2022-09-07 DIAGNOSIS — R35.1 NOCTURIA: ICD-10-CM

## 2022-09-07 PROCEDURE — 1126F AMNT PAIN NOTED NONE PRSNT: CPT | Performed by: FAMILY MEDICINE

## 2022-09-07 PROCEDURE — G0439 PPPS, SUBSEQ VISIT: HCPCS | Performed by: FAMILY MEDICINE

## 2022-09-07 PROCEDURE — 1160F RVW MEDS BY RX/DR IN RCRD: CPT | Performed by: FAMILY MEDICINE

## 2022-09-07 PROCEDURE — 1170F FXNL STATUS ASSESSED: CPT | Performed by: FAMILY MEDICINE

## 2022-09-07 NOTE — PATIENT INSTRUCTIONS
Medicare Wellness  Personal Prevention Plan of Service     Date of Office Visit:    Encounter Provider:  Jama Moses MD  Place of Service:  [facility] PRIMARY [facility]ARE  Patient Name: Jama Birch III  :  1950    As part of the Medicare Wellness portion of your visit today, we are providing you with this personalized preventive plan of services (PPPS). This plan is based upon recommendations of the [place] Preventive Services Task Force (USPSTF) and the Advisory Committee on Immunization Practices (ACIP).    This lists the preventive care services that should be considered, and provides dates of when you are due. Items listed as completed are up-to-date and do not require any further intervention.    Health Maintenance   Topic Date Due   • INFLUENZA VACCINE  10/01/2022   • LIPID PANEL  2023   • ANNUAL WELLNESS VISIT  2023   • TDAP/TD VACCINES (2 - Td or Tdap) 2026   • COLORECTAL CANCER SCREENING  2027   • HEPATITIS C SCREENING  Completed   • COVID-19 Vaccine  Completed   • Pneumococcal Vaccine 65+  Completed   • ZOSTER VACCINE  Completed       No orders of the defined types were placed in this encounter.      No follow-ups on file.          Medicare Wellness  Personal Prevention Plan of Service     Date of Office Visit:    Encounter Provider:  Jama Moses MD  Place of Service:  [facility]  Patient Name: Jama Birch III  :  1950    As part of the Medicare Wellness portion of your visit today, we are providing you with this personalized preventive plan of services (PPPS). This plan is based upon recommendations of the [place] Preventive Services Task Force (USPSTF) and the Advisory Committee on Immunization Practices (ACIP).    This lists the preventive care services that should be considered, and provides dates of when you are due. Items listed as completed are up-to-date and do not require any further  intervention.    Health Maintenance   Topic Date Due   • INFLUENZA VACCINE  10/01/2022   • LIPID PANEL  02/22/2023   • ANNUAL WELLNESS VISIT  09/07/2023   • TDAP/TD VACCINES (2 - Td or Tdap) 05/09/2026   • COLORECTAL CANCER SCREENING  01/13/2027   • HEPATITIS C SCREENING  Completed   • COVID-19 Vaccine  Completed   • Pneumococcal Vaccine 65+  Completed   • ZOSTER VACCINE  Completed       Orders Placed This Encounter   Procedures   • PSA Diagnostic-[facility] or Reference Lab, or [facility]     Order Specific Question:   Release to patient     Answer:   Routine Release       No follow-ups on file.

## 2022-09-07 NOTE — PROGRESS NOTES
The ABCs of the Annual Wellness Visit  Subsequent Medicare Wellness Visit    Chief Complaint   Patient presents with   • Medicare Wellness-subsequent      Subjective    History of Present Illness:  Jama Birch III is a 72 y.o. male who presents for a Subsequent Medicare Wellness Visit.  Weight in (lb) to have BMI = 25: 168.4  The following portions of the patient's history were reviewed and   updated as appropriate: allergies, current medications, past family history, past medical history, past social history, past surgical history and problem list.    Compared to one year ago, the patient feels his physical   health is better.    Compared to one year ago, the patient feels his mental   health is the same.    Recent Hospitalizations:  He was not admitted to the hospital during the last year.       Current Medical Providers:  Patient Care Team:  Jama Moses MD as PCP - General (Family Medicine)  Alfie Hooks MD as Consulting Physician (General Surgery)  Neri Lin MD as Consulting Physician (Ophthalmology)  Jama Tobin (Dental General Practice)  Vicente Schmitz III, MD as Consulting Physician (Cardiology)  Jama Lopez MD as Consulting Physician (Dermatology)    Outpatient Medications Prior to Visit   Medication Sig Dispense Refill   • atorvastatin (LIPITOR) 40 MG tablet TAKE 1 TABLET BY MOUTH EVERY DAY EVERY NIGHT 90 tablet 3   • clopidogrel (PLAVIX) 75 MG tablet TAKE 1 TABLET BY MOUTH EVERY DAY 90 tablet 3   • loratadine (CLARITIN) 10 MG tablet Take 10 mg by mouth Daily.     • sildenafil (REVATIO) 20 MG tablet Take 3-5 tablets  po 60 minutes prior to sexual activity with a light meal as needed 50 tablet 11     No facility-administered medications prior to visit.       No opioid medication identified on active medication list. I have reviewed chart for other potential  high risk medication/s and harmful drug interactions in the elderly.          Aspirin is not on active  "medication list.  Aspirin use is contraindicated for this patient due to: current use of clopidogrel.  .    Patient Active Problem List   Diagnosis   • Lipoma of torso   • Nocturia   • History of kidney stones   • Carotid artery plaque, right   • Coronary artery disease involving native coronary artery of native heart without angina pectoris   • Low HDL (under 40)   • Presence of drug coated stent in LAD coronary artery   • Actinic keratosis of scalp   • History of non-ST elevation myocardial infarction (NSTEMI)   • Squamous cell carcinoma in situ (SCCIS) of scalp   • Overweight with body mass index (BMI) of 25 to 25.9 in adult   • Other male erectile dysfunction     Advance Care Planning  Advance Directive is on file.  ACP discussion was held with the patient during this visit. Patient has an advance directive in EMR which is still valid.           Objective    Vitals:    09/07/22 0955   BP: 100/70   Pulse: 73   Resp: 18   Temp: 97 °F (36.1 °C)   SpO2: 97%   Weight: 80.3 kg (177 lb)   Height: 175 cm (68.9\")   PainSc: 0-No pain     Estimated body mass index is 26.22 kg/m² as calculated from the following:    Height as of this encounter: 175 cm (68.9\").    Weight as of this encounter: 80.3 kg (177 lb).    BMI is >= 25 and <30. (Overweight) The following options were offered after discussion;: weight loss educational material (shared in after visit summary) and exercise counseling/recommendations      Does the patient have evidence of cognitive impairment? No    Physical Exam  Vitals reviewed.   Constitutional:       General: He is not in acute distress.  Eyes:      General: Lids are normal.      Conjunctiva/sclera: Conjunctivae normal.   Neck:      Vascular: No carotid bruit.      Trachea: No tracheal deviation.   Cardiovascular:      Rate and Rhythm: Normal rate and regular rhythm.      Heart sounds: Normal heart sounds. No murmur heard.  Pulmonary:      Effort: Pulmonary effort is normal.      Breath sounds: Normal " breath sounds.   Skin:     General: Skin is warm and dry.   Neurological:      Mental Status: He is alert. He is not disoriented.   Psychiatric:         Speech: Speech normal.         Behavior: Behavior normal. Behavior is cooperative.                 HEALTH RISK ASSESSMENT    Smoking Status:  Social History     Tobacco Use   Smoking Status Never Smoker   Smokeless Tobacco Never Used     Alcohol Consumption:  Social History     Substance and Sexual Activity   Alcohol Use Yes    Comment: Infrequently     Fall Risk Screen:    DANIEL Fall Risk Assessment was completed, and patient is at LOW risk for falls.Assessment completed on:9/7/2022    Depression Screening:  PHQ-2/PHQ-9 Depression Screening 9/7/2022   Retired Total Score -   Little Interest or Pleasure in Doing Things 0-->not at all   Feeling Down, Depressed or Hopeless -   PHQ-9: Brief Depression Severity Measure Score 0       Health Habits and Functional and Cognitive Screening:  Functional & Cognitive Status 9/7/2022   Do you have difficulty preparing food and eating? No   Do you have difficulty bathing yourself, getting dressed or grooming yourself? No   Do you have difficulty using the toilet? No   Do you have difficulty moving around from place to place? No   Do you have trouble with steps or getting out of a bed or a chair? No   Current Diet Well Balanced Diet   Dental Exam Up to date   Eye Exam Up to date   Exercise (times per week) 5 times per week   Current Exercises Include Walking   Current Exercise Activities Include -   Do you need help using the phone?  No   Are you deaf or do you have serious difficulty hearing?  No   Do you need help with transportation? No   Do you need help shopping? No   Do you need help preparing meals?  No   Do you need help with housework?  No   Do you need help with laundry? No   Do you need help taking your medications? No   Do you need help managing money? No   Do you ever drive or ride in a car without wearing a seat  belt? No   Have you felt unusual stress, anger or loneliness in the last month? -   Who do you live with? -   If you need help, do you have trouble finding someone available to you? -   Have you been bothered in the last four weeks by sexual problems? -   Do you have difficulty concentrating, remembering or making decisions? -       Age-appropriate Screening Schedule:  Refer to the list below for future screening recommendations based on patient's age, sex and/or medical conditions. Orders for these recommended tests are listed in the plan section. The patient has been provided with a written plan.    Health Maintenance   Topic Date Due   • INFLUENZA VACCINE  10/01/2022   • LIPID PANEL  02/22/2023   • TDAP/TD VACCINES (2 - Td or Tdap) 05/09/2026   • ZOSTER VACCINE  Completed              Assessment & Plan   CMS Preventative Services Quick Reference  Risk Factors Identified During Encounter  Obesity/Overweight   The above risks/problems have been discussed with the patient.  Follow up actions/plans if indicated are seen below in the Assessment/Plan Section.  Pertinent information has been shared with the patient in the After Visit Summary.    Diagnoses and all orders for this visit:    1. Medicare annual wellness visit, subsequent (Primary)  Comments:  normal exam. flu shot in fall recommended as well as next covid booster this fall. continue with healthy diet and exercise.     2. Nocturia  Assessment & Plan:  Nocturia symptoms are stable. PSA will be monitored with annual labs.       Orders:  -     PSA Diagnostic-[facility] or Reference Lab, or [facility]      Follow Up:   Return in about 1 year (around 9/7/2023) for Medicare Wellness Visit, schedule 30 min.     An After Visit Summary and PPPS were made available to the patient.

## 2022-09-08 LAB — PSA SERPL-MCNC: 0.47 NG/ML (ref 0–4)

## 2023-02-05 DIAGNOSIS — I25.10 CORONARY ARTERY DISEASE INVOLVING NATIVE CORONARY ARTERY OF NATIVE HEART WITHOUT ANGINA PECTORIS: Chronic | ICD-10-CM

## 2023-02-05 DIAGNOSIS — E78.6 LOW HDL (UNDER 40): ICD-10-CM

## 2023-02-05 DIAGNOSIS — Z95.5 PRESENCE OF DRUG COATED STENT IN LAD CORONARY ARTERY: ICD-10-CM

## 2023-02-06 RX ORDER — CLOPIDOGREL BISULFATE 75 MG/1
TABLET ORAL
Qty: 90 TABLET | Refills: 0 | Status: SHIPPED | OUTPATIENT
Start: 2023-02-06

## 2023-04-18 ENCOUNTER — OFFICE VISIT (OUTPATIENT)
Dept: CARDIOLOGY | Facility: CLINIC | Age: 73
End: 2023-04-18
Payer: MEDICARE

## 2023-04-18 VITALS
HEART RATE: 62 BPM | HEIGHT: 69 IN | WEIGHT: 179.2 LBS | BODY MASS INDEX: 26.54 KG/M2 | SYSTOLIC BLOOD PRESSURE: 122 MMHG | DIASTOLIC BLOOD PRESSURE: 70 MMHG

## 2023-04-18 DIAGNOSIS — I25.10 CORONARY ARTERY DISEASE INVOLVING NATIVE CORONARY ARTERY OF NATIVE HEART WITHOUT ANGINA PECTORIS: Primary | Chronic | ICD-10-CM

## 2023-04-18 DIAGNOSIS — I25.2 HISTORY OF NON-ST ELEVATION MYOCARDIAL INFARCTION (NSTEMI): Chronic | ICD-10-CM

## 2023-04-18 DIAGNOSIS — Z95.5 PRESENCE OF DRUG COATED STENT IN LAD CORONARY ARTERY: Chronic | ICD-10-CM

## 2023-04-18 DIAGNOSIS — E78.6 LOW HDL (UNDER 40): Chronic | ICD-10-CM

## 2023-04-18 NOTE — PROGRESS NOTES
Subjective:     Encounter Date: 04/18/23        Patient ID: Jama Birch III is a 72 y.o. male.    Chief Complaint: CAD  History of Present Illness    Dear Dr. Moses,    I had the pleasure of seeing this patient in the office today in follow-up of his CAD.    He feels fine without any issues. No issues with any cardiac complaints. He denies any chest pain, pressure, tightness, squeezing, or heartburn.  He has not experienced any feeling of palpitations, tachycardia or heart racing and no presyncope or syncope.  There has not been any problems with dizziness or lightheadedness.  There has not been any orthopnea or PND, and no problems with lower extremity edema.  He denies any shortness of breath at rest or with activity and has not had any wheezing.  He  has continued to perform daily activities of living without any specific problem or change in the level of activity.    Stress test October 2019:  Interpretation Summary     · Myocardial perfusion imaging indicates a normal myocardial perfusion study with no evidence of ischemia.  · Left ventricular ejection fraction is normal (Calculated EF = 64%).  · Impressions are consistent with a low risk study.  · There is no prior study available for comparison.         As you know he initially presented with chest discomfort to [facility].  Initial troponin was elevated 0.3.  He was admitted to the hospital, treated with oral and parenteral anti-ischemic therapy, and then had a cardiac catheterization that afternoon that demonstrated complete occlusion of the LAD in the mid segment.  This was then treated with a drug-eluting stent.    Patient states that since going home he has had complete resolution of the angina pectoris that he was experiencing beforehand.  He is back to doing about 8000 steps a day and is interested in increasing his activity back to what he was doing before.  He is not having chest pain or chest discomfort.  No  shortness of breath with activity.  No tachycardia or palpitations.  Cath 3/2019:  Hemodynamics:   LV:103/4  AO: 103/62        PCI CORONARY SEGMENT: Mid LAD  PRE-STENOSIS:100  POST-STENOSIS: 0%  LESION TYPE: c  SHILOH FLOW PRE/POST: 0/3  CULPRIT LESION: Yes     Estimated blood loss: Minimal  Complications: None     Conclusions:   1. Left main: Normal  2. LAD: Acutely occluded in the midsegment  3. LCX: Dominant vessel.  Normal.  4. RCA: Small caliber nondominant vessel.  Normal.  5.  Normal left ventricular size.  Mildly reduced systolic function.  Mild anterolateral hypokinesis.  Severe mid to distal inferior wall hypokinesis  7.  PCI of the proximal mid LAD with a 3.5 x 28 mm Xience CRI drug-eluting stent     Recommendations dual antiplatelet therapy for 1 year       The following portions of the patient's history were reviewed and updated as appropriate: allergies, current medications, past family history, past medical history, past social history, past surgical history and problem list.    Past Medical History:   Diagnosis Date   • Abnormal CT scan; splenic flexure thickening 06/16/2016   • Atherosclerosis of both carotid arteries    • Cancer 9/8/2019   • Coronary artery disease involving native coronary artery of native heart without angina pectoris 04/04/2019   • Erectile dysfunction 12/21    Becoming more severe   • History of non-ST elevation myocardial infarction (NSTEMI) 02/17/2020   • Myocardial infarction 3/26/2019       Past Surgical History:   Procedure Laterality Date   • CARDIAC CATHETERIZATION N/A 3/26/2019    Procedure: Left Heart Cath;  Surgeon: Sachin Barrera MD;  Location:  [facility];  Service: Cardiology   • CARDIAC CATHETERIZATION N/A 3/26/2019    Procedure: Left ventriculography;  Surgeon: Sachin Barrera MD;  Location:  [facility];  Service: Cardiology   • CARDIAC CATHETERIZATION N/A 3/26/2019    Procedure: Coronary angiography;  Surgeon:  "Sachin Barrera MD;  Location: [facility] CATH INVASIVE LOCATION;  Service: Cardiology   • CARDIAC CATHETERIZATION N/A 3/26/2019    Procedure: Stent PRASANNA coronary;  Surgeon: Sachin Barrera MD;  Location: [facility] CATH INVASIVE LOCATION;  Service: Cardiology   • CORONARY ANGIOPLASTY WITH STENT PLACEMENT  03/26/2019    LAD stent   • SKIN CANCER EXCISION  2019    scalp             ECG 12 Lead    Date/Time: 4/18/2023 9:35 AM  Performed by: Vicente Schmitz III, MD  Authorized by: Vicente Schmitz III, MD   Comparison: compared with previous ECG   Similar to previous ECG  Rhythm: sinus rhythm  Rate: normal  Conduction: conduction normal  ST Segments: ST segments normal  T Waves: T waves normal  QRS axis: normal  Other: no other findings    Clinical impression: normal ECG               Objective:     Vitals:    04/18/23 0914   BP: 122/70   BP Location: Right arm   Patient Position: Sitting   Cuff Size: Adult   Pulse: 62   Weight: 81.3 kg (179 lb 3.2 oz)   Height: 175 cm (68.9\")         Physical Exam  Constitutional:       General: He is not in acute distress.     Appearance: He is well-developed. He is not diaphoretic.   HENT:      Head: Normocephalic and atraumatic.      Nose: Nose normal.   Eyes:      General:         Right eye: No discharge.         Left eye: No discharge.      Conjunctiva/sclera: Conjunctivae normal.      Pupils: Pupils are equal, round, and reactive to light.   Neck:      Thyroid: No thyromegaly.      Trachea: No tracheal deviation.   Cardiovascular:      Rate and Rhythm: Normal rate and regular rhythm.      Pulses: Normal pulses.      Heart sounds: Normal heart sounds, S1 normal and S2 normal.     No S3 sounds.   Pulmonary:      Effort: Pulmonary effort is normal. No respiratory distress.      Breath sounds: Normal breath sounds. No stridor.   Chest:      Chest wall: No tenderness.   Abdominal:      General: Bowel sounds are normal. There is no distension.      Palpations: Abdomen is soft. There " is no mass.      Tenderness: There is no abdominal tenderness. There is no guarding or rebound.   Musculoskeletal:         General: No tenderness or deformity. Normal range of motion.      Cervical back: Normal range of motion and neck supple.   Lymphadenopathy:      Cervical: No cervical adenopathy.   Skin:     General: Skin is warm and dry.      Findings: No erythema or rash.   Neurological:      Mental Status: He is alert and oriented to person, place, and time.      Deep Tendon Reflexes: Reflexes are normal and symmetric.   Psychiatric:         Thought Content: Thought content normal.         Lab Review:           Lab Results   Component Value Date    GLUCOSE 93 02/22/2022    BUN 12 02/22/2022    CREATININE 0.89 02/22/2022    EGFRIFNONA 85 02/22/2022    EGFRIFAFRI 98 06/24/2019    BCR 13.5 02/22/2022    K 4.0 02/22/2022    CO2 26.0 02/22/2022    CALCIUM 8.6 02/22/2022    PROTENTOTREF 6.6 06/24/2019    ALBUMIN 4.10 02/22/2022    LABIL2 1.4 06/24/2019    AST 20 02/22/2022    ALT 19 02/22/2022     Lab Results   Component Value Date    CHOL 88 02/22/2022    CHOL 81 07/22/2020    CHOL 122 03/27/2019     Lab Results   Component Value Date    TRIG 52 02/22/2022    TRIG 31 07/22/2020    TRIG 43 06/24/2019     Lab Results   Component Value Date    HDL 44 02/22/2022    HDL 42 07/22/2020    HDL 30 (L) 03/27/2019     Lab Results   Component Value Date    LDL 31 02/22/2022    LDL 33 07/22/2020    LDL 70 03/27/2019     Lab Results   Component Value Date    VLDL 13 02/22/2022    VLDL 6.2 07/22/2020    VLDL 21.8 03/27/2019     Lab Results   Component Value Date    LDLHDL 0.76 02/22/2022    LDLHDL 0.78 07/22/2020    LDLHDL 2.34 03/27/2019       Results for orders placed during the hospital encounter of 11/11/19    Adult Transthoracic Echo Complete W/ Cont if Necessary Per Protocol    Interpretation Summary  · Left ventricular systolic function is normal. Calculated EF = 61%. Estimated EF was in agreement with the calculated EF.  Estimated EF = 61%. Normal left ventricular cavity size and wall thickness noted. All left ventricular wall segments contract normally. Left ventricular diastolic function is normal.  · Left atrial volume is mildly increased. Saline test results are negative.  · There is moderate thickening of the noncoronary cusp of the aortic valve.  · Moderate MAC is present. Mild mitral valve regurgitation is present.  · Physiologic tricuspid valve regurgitation is present. Insufficient TR velocity profile to estimate the right ventricular systolic pressure.            Assessment:          Diagnosis Plan   1. Coronary artery disease involving native coronary artery of native heart without angina pectoris  ECG 12 Lead      2. Low HDL (under 40)  ECG 12 Lead      3. Presence of drug coated stent in LAD coronary artery  ECG 12 Lead      4. History of non-ST elevation myocardial infarction (NSTEMI)  ECG 12 Lead             Plan:       1. Coronary artery Disease: Status post stent placement to the LAD in 2019.  His anginal symptoms at that time were left arm pain, shortness of breath, chest pain, and headache.   2.  Mixed hyperlipidemia, continue atorvastatin, LDL, HDL, AST and ALT are reviewed.  3.  Carotid artery plaque, noted on vascular screening in 2019, continue risk factor modification.    Thank you very much for allowing us to participate in the care of this pleasant patient.  Please don't hesitate to call if I can be of assistance in any way.      Current Outpatient Medications:   •  atorvastatin (LIPITOR) 40 MG tablet, TAKE 1 TABLET BY MOUTH EVERY DAY EVERY NIGHT, Disp: 90 tablet, Rfl: 3  •  clopidogrel (PLAVIX) 75 MG tablet, TAKE 1 TABLET BY MOUTH EVERY DAY, Disp: 90 tablet, Rfl: 0  •  loratadine (CLARITIN) 10 MG tablet, Take 1 tablet by mouth Daily., Disp: , Rfl:   •  sildenafil (REVATIO) 20 MG tablet, Take 3-5 tablets  po 60 minutes prior to sexual activity with a light meal as needed, Disp: 50 tablet, Rfl: 11

## 2023-05-03 DIAGNOSIS — Z95.5 PRESENCE OF DRUG COATED STENT IN LAD CORONARY ARTERY: ICD-10-CM

## 2023-05-03 DIAGNOSIS — E78.6 LOW HDL (UNDER 40): ICD-10-CM

## 2023-05-03 DIAGNOSIS — I25.10 CORONARY ARTERY DISEASE INVOLVING NATIVE CORONARY ARTERY OF NATIVE HEART WITHOUT ANGINA PECTORIS: Chronic | ICD-10-CM

## 2023-05-03 RX ORDER — ATORVASTATIN CALCIUM 40 MG/1
TABLET, FILM COATED ORAL
Qty: 90 TABLET | Refills: 3 | Status: SHIPPED | OUTPATIENT
Start: 2023-05-03

## 2023-05-03 RX ORDER — CLOPIDOGREL BISULFATE 75 MG/1
TABLET ORAL
Qty: 90 TABLET | Refills: 0 | Status: SHIPPED | OUTPATIENT
Start: 2023-05-03

## 2023-07-30 DIAGNOSIS — Z95.5 PRESENCE OF DRUG COATED STENT IN LAD CORONARY ARTERY: ICD-10-CM

## 2023-07-30 DIAGNOSIS — E78.6 LOW HDL (UNDER 40): ICD-10-CM

## 2023-07-30 DIAGNOSIS — I25.10 CORONARY ARTERY DISEASE INVOLVING NATIVE CORONARY ARTERY OF NATIVE HEART WITHOUT ANGINA PECTORIS: Chronic | ICD-10-CM

## 2023-07-31 RX ORDER — CLOPIDOGREL BISULFATE 75 MG/1
TABLET ORAL
Qty: 90 TABLET | Refills: 0 | Status: SHIPPED | OUTPATIENT
Start: 2023-07-31

## 2023-09-11 ENCOUNTER — OFFICE VISIT (OUTPATIENT)
Dept: FAMILY MEDICINE CLINIC | Facility: CLINIC | Age: 73
End: 2023-09-11
Payer: MEDICARE

## 2023-09-11 VITALS
TEMPERATURE: 98 F | DIASTOLIC BLOOD PRESSURE: 72 MMHG | HEIGHT: 68 IN | RESPIRATION RATE: 16 BRPM | OXYGEN SATURATION: 97 % | WEIGHT: 178.6 LBS | SYSTOLIC BLOOD PRESSURE: 125 MMHG | BODY MASS INDEX: 27.07 KG/M2

## 2023-09-11 DIAGNOSIS — I25.10 CORONARY ARTERY DISEASE INVOLVING NATIVE CORONARY ARTERY OF NATIVE HEART WITHOUT ANGINA PECTORIS: Chronic | ICD-10-CM

## 2023-09-11 DIAGNOSIS — Z13.6 SCREENING FOR ISCHEMIC HEART DISEASE: ICD-10-CM

## 2023-09-11 DIAGNOSIS — Z95.5 PRESENCE OF DRUG COATED STENT IN LAD CORONARY ARTERY: Chronic | ICD-10-CM

## 2023-09-11 DIAGNOSIS — Z00.00 MEDICARE ANNUAL WELLNESS VISIT, SUBSEQUENT: Primary | ICD-10-CM

## 2023-09-11 DIAGNOSIS — R35.1 NOCTURIA: ICD-10-CM

## 2023-09-11 DIAGNOSIS — I25.2 HISTORY OF NON-ST ELEVATION MYOCARDIAL INFARCTION (NSTEMI): Chronic | ICD-10-CM

## 2023-09-11 DIAGNOSIS — E78.6 LOW HDL (UNDER 40): Chronic | ICD-10-CM

## 2023-09-11 LAB
ALBUMIN SERPL-MCNC: 4.6 G/DL (ref 3.5–5.2)
ALBUMIN/GLOB SERPL: 2.1 G/DL
ALP SERPL-CCNC: 75 U/L (ref 39–117)
ALT SERPL-CCNC: 26 U/L (ref 1–41)
AST SERPL-CCNC: 23 U/L (ref 1–40)
BASOPHILS # BLD AUTO: 0.04 10*3/MM3 (ref 0–0.2)
BASOPHILS NFR BLD AUTO: 1 % (ref 0–1.5)
BILIRUB SERPL-MCNC: 0.2 MG/DL (ref 0–1.2)
BUN SERPL-MCNC: 13 MG/DL (ref 8–23)
BUN/CREAT SERPL: 13 (ref 7–25)
CALCIUM SERPL-MCNC: 9.4 MG/DL (ref 8.6–10.5)
CHLORIDE SERPL-SCNC: 108 MMOL/L (ref 98–107)
CHOLEST SERPL-MCNC: 73 MG/DL (ref 0–200)
CHOLEST/HDLC SERPL: 2.15 {RATIO}
CO2 SERPL-SCNC: 25.3 MMOL/L (ref 22–29)
CREAT SERPL-MCNC: 1 MG/DL (ref 0.76–1.27)
EGFRCR SERPLBLD CKD-EPI 2021: 80 ML/MIN/1.73
EOSINOPHIL # BLD AUTO: 0.08 10*3/MM3 (ref 0–0.4)
EOSINOPHIL NFR BLD AUTO: 2 % (ref 0.3–6.2)
ERYTHROCYTE [DISTWIDTH] IN BLOOD BY AUTOMATED COUNT: 13.3 % (ref 12.3–15.4)
GLOBULIN SER CALC-MCNC: 2.2 GM/DL
GLUCOSE SERPL-MCNC: 98 MG/DL (ref 65–99)
HCT VFR BLD AUTO: 40 % (ref 37.5–51)
HDLC SERPL-MCNC: 34 MG/DL (ref 40–60)
HGB BLD-MCNC: 13.4 G/DL (ref 13–17.7)
IMM GRANULOCYTES # BLD AUTO: 0.01 10*3/MM3 (ref 0–0.05)
IMM GRANULOCYTES NFR BLD AUTO: 0.3 % (ref 0–0.5)
LDLC SERPL CALC-MCNC: 29 MG/DL (ref 0–100)
LYMPHOCYTES # BLD AUTO: 0.97 10*3/MM3 (ref 0.7–3.1)
LYMPHOCYTES NFR BLD AUTO: 24.6 % (ref 19.6–45.3)
MCH RBC QN AUTO: 29.4 PG (ref 26.6–33)
MCHC RBC AUTO-ENTMCNC: 33.5 G/DL (ref 31.5–35.7)
MCV RBC AUTO: 87.7 FL (ref 79–97)
MONOCYTES # BLD AUTO: 0.48 10*3/MM3 (ref 0.1–0.9)
MONOCYTES NFR BLD AUTO: 12.2 % (ref 5–12)
NEUTROPHILS # BLD AUTO: 2.37 10*3/MM3 (ref 1.7–7)
NEUTROPHILS NFR BLD AUTO: 59.9 % (ref 42.7–76)
NRBC BLD AUTO-RTO: 0 /100 WBC (ref 0–0.2)
PLATELET # BLD AUTO: 218 10*3/MM3 (ref 140–450)
POTASSIUM SERPL-SCNC: 4.9 MMOL/L (ref 3.5–5.2)
PROT SERPL-MCNC: 6.8 G/DL (ref 6–8.5)
PSA SERPL-MCNC: 0.44 NG/ML (ref 0–4)
RBC # BLD AUTO: 4.56 10*6/MM3 (ref 4.14–5.8)
SODIUM SERPL-SCNC: 141 MMOL/L (ref 136–145)
TRIGL SERPL-MCNC: 29 MG/DL (ref 0–150)
VLDLC SERPL CALC-MCNC: 10 MG/DL (ref 5–40)
WBC # BLD AUTO: 3.95 10*3/MM3 (ref 3.4–10.8)

## 2023-09-11 PROCEDURE — G0439 PPPS, SUBSEQ VISIT: HCPCS | Performed by: FAMILY MEDICINE

## 2023-09-11 PROCEDURE — 1170F FXNL STATUS ASSESSED: CPT | Performed by: FAMILY MEDICINE

## 2023-09-11 NOTE — PROGRESS NOTES
The ABCs of the Annual Wellness Visit  Subsequent Medicare Wellness Visit    Subjective      Jama Birch III is a 73 y.o. male who presents for a Subsequent Medicare Wellness Visit.    The following portions of the patient's history were reviewed and   updated as appropriate: allergies, current medications, past family history, past medical history, past social history, past surgical history, and problem list.    Compared to one year ago, the patient feels his physical   health is the same.    Compared to one year ago, the patient feels his mental   health is the same.    Recent Hospitalizations:  He was not admitted to the hospital during the last year.       Current Medical Providers:  Patient Care Team:  Jama Moses MD as PCP - General (Family Medicine)  Alfie Hooks MD as Consulting Physician (General Surgery)  Neri Lin MD as Consulting Physician (Ophthalmology)  Jama Tobin (Dental General Practice)  Vicente Schmitz III, MD as Consulting Physician (Cardiology)  Jama Lopez MD as Consulting Physician (Dermatology)    Outpatient Medications Prior to Visit   Medication Sig Dispense Refill    atorvastatin (LIPITOR) 40 MG tablet TAKE 1 TABLET BY MOUTH EVERY DAY EVERY NIGHT 90 tablet 3    clopidogrel (PLAVIX) 75 MG tablet TAKE 1 TABLET BY MOUTH EVERY DAY 90 tablet 0    loratadine (CLARITIN) 10 MG tablet Take 1 tablet by mouth Daily.      sildenafil (REVATIO) 20 MG tablet Take 3-5 tablets  po 60 minutes prior to sexual activity with a light meal as needed 50 tablet 11     No facility-administered medications prior to visit.       No opioid medication identified on active medication list. I have reviewed chart for other potential  high risk medication/s and harmful drug interactions in the elderly.        Aspirin is not on active medication list.  Aspirin use is contraindicated for this patient due to: current use of clopidogrel.  .    Patient Active Problem List   Diagnosis     "Lipoma of torso    Nocturia    History of kidney stones    Carotid artery plaque, right    Coronary artery disease involving native coronary artery of native heart without angina pectoris    Low HDL (under 40)    Presence of drug coated stent in LAD coronary artery    Actinic keratosis of scalp    History of non-ST elevation myocardial infarction (NSTEMI)    Squamous cell carcinoma in situ (SCCIS) of scalp    Overweight with body mass index (BMI) of 25 to 25.9 in adult    Other male erectile dysfunction     Advance Care Planning   Advance Care Planning     Advance Directive is on file.  ACP discussion was held with the patient during this visit. Patient has an advance directive in EMR which is still valid.      Objective    Vitals:    09/11/23 0954   BP: 125/72   BP Location: Left arm   Patient Position: Sitting   Resp: 16   Temp: 98 °F (36.7 °C)   TempSrc: Oral   SpO2: 97%   Weight: 81 kg (178 lb 9.6 oz)   Height: 172.7 cm (68\")     Estimated body mass index is 27.16 kg/m² as calculated from the following:    Height as of this encounter: 172.7 cm (68\").    Weight as of this encounter: 81 kg (178 lb 9.6 oz).    BMI is >= 25 and <30. (Overweight) The following options were offered after discussion;: weight loss educational material (shared in after visit summary), exercise counseling/recommendations, and nutrition counseling/recommendations      Does the patient have evidence of cognitive impairment?   No          Physical Exam  Constitutional:       General: He is not in acute distress.     Appearance: He is overweight.   Cardiovascular:      Rate and Rhythm: Normal rate and regular rhythm.      Heart sounds: Normal heart sounds.   Pulmonary:      Effort: Pulmonary effort is normal.      Breath sounds: Normal breath sounds.   Neurological:      Mental Status: He is alert.      HEALTH RISK ASSESSMENT    Smoking Status:  Social History     Tobacco Use   Smoking Status Never   Smokeless Tobacco Never     Alcohol " Consumption:  Social History     Substance and Sexual Activity   Alcohol Use Yes    Comment: Infrequently     Fall Risk Screen:    DANIEL Fall Risk Assessment has not been completed.    Depression Screenin/11/2023     9:57 AM   PHQ-2/PHQ-9 Depression Screening   Little Interest or Pleasure in Doing Things 0-->not at all   Feeling Down, Depressed or Hopeless 0-->not at all   PHQ-9: Brief Depression Severity Measure Score 0       Health Habits and Functional and Cognitive Screenin/11/2023     9:00 AM   Functional & Cognitive Status   Do you have difficulty preparing food and eating? No   Do you have difficulty bathing yourself, getting dressed or grooming yourself? No   Do you have difficulty using the toilet? No   Do you have difficulty moving around from place to place? No   Do you have trouble with steps or getting out of a bed or a chair? No   Current Diet Well Balanced Diet   Dental Exam Up to date   Eye Exam Up to date   Exercise (times per week) 5 times per week   Current Exercises Include Walking   Do you need help using the phone?  No   Are you deaf or do you have serious difficulty hearing?  No   Do you need help to go to places out of walking distance? No   Do you need help shopping? No   Do you need help preparing meals?  No   Do you need help with housework?  No   Do you need help with laundry? No   Do you need help taking your medications? No   Do you need help managing money? No   Do you ever drive or ride in a car without wearing a seat belt? No   Have you felt unusual stress, anger or loneliness in the last month? No   Who do you live with? Spouse   If you need help, do you have trouble finding someone available to you? No   Have you been bothered in the last four weeks by sexual problems? No   Do you have difficulty concentrating, remembering or making decisions? No       Age-appropriate Screening Schedule:  Refer to the list below for future screening recommendations based on  patient's age, sex and/or medical conditions. Orders for these recommended tests are listed in the plan section. The patient has been provided with a written plan.    Health Maintenance   Topic Date Due    LIPID PANEL  02/22/2023    COVID-19 Vaccine (6 - Pfizer series) 03/10/2023    BMI FOLLOWUP  09/07/2023    INFLUENZA VACCINE  10/01/2023    ANNUAL WELLNESS VISIT  09/11/2024    TDAP/TD VACCINES (2 - Td or Tdap) 05/09/2026    COLORECTAL CANCER SCREENING  01/13/2027    HEPATITIS C SCREENING  Completed    Pneumococcal Vaccine 65+  Completed    ZOSTER VACCINE  Completed                  CMS Preventative Services Quick Reference  Risk Factors Identified During Encounter:    Immunizations Discussed/Encouraged: Influenza and COVID19    The above risks/problems have been discussed with the patient.  Pertinent information has been shared with the patient in the After Visit Summary.    Diagnoses and all orders for this visit:    1. Medicare annual wellness visit, subsequent (Primary)    2. Screening for ischemic heart disease  -     Comprehensive Metabolic Panel  -     CBC & Differential  -     Lipid Panel With / Chol / HDL Ratio    3. Nocturia  -     PSA DIAGNOSTIC    4. Coronary artery disease involving native coronary artery of native heart without angina pectoris  -     CBC & Differential  -     Lipid Panel With / Chol / HDL Ratio    5. Low HDL (under 40)  -     Lipid Panel With / Chol / HDL Ratio    6. Presence of drug coated stent in LAD coronary artery  -     Lipid Panel With / Chol / HDL Ratio    7. History of non-ST elevation myocardial infarction (NSTEMI)  -     Lipid Panel With / Chol / HDL Ratio        Follow Up:   Next Medicare Wellness visit to be scheduled in 1 year.      An After Visit Summary and PPPS were made available to the patient.

## 2023-09-11 NOTE — PATIENT INSTRUCTIONS
Calorie Counting for Weight Loss  Calories are units of energy. Your body needs a certain number of calories from food to keep going throughout the day. When you eat or drink more calories than your body needs, your body stores the extra calories mostly as fat. When you eat or drink fewer calories than your body needs, your body burns fat to get the energy it needs.  Calorie counting means keeping track of how many calories you eat and drink each day. Calorie counting can be helpful if you need to lose weight. If you eat fewer calories than your body needs, you should lose weight. Ask your health care provider what a healthy weight is for you.  For calorie counting to work, you will need to eat the right number of calories each day to lose a healthy amount of weight per week. A dietitian can help you figure out how many calories you need in a day and will suggest ways to reach your calorie goal.  A healthy amount of weight to lose each week is usually 1-2 lb (0.5-0.9 kg). This usually means that your daily calorie intake should be reduced by 500-750 calories.  Eating 1,200-1,500 calories a day can help most women lose weight.  Eating 1,500-1,800 calories a day can help most men lose weight.  What do I need to know about calorie counting?  Work with your health care provider or dietitian to determine how many calories you should get each day. To meet your daily calorie goal, you will need to:  Find out how many calories are in each food that you would like to eat. Try to do this before you eat.  Decide how much of the food you plan to eat.  Keep a food log. Do this by writing down what you ate and how many calories it had.  To successfully lose weight, it is important to balance calorie counting with a healthy lifestyle that includes regular activity.  Where do I find calorie information?    The number of calories in a food can be found on a Nutrition Facts label. If a food does not have a Nutrition Facts label, try  to look up the calories online or ask your dietitian for help.  Remember that calories are listed per serving. If you choose to have more than one serving of a food, you will have to multiply the calories per serving by the number of servings you plan to eat. For example, the label on a package of bread might say that a serving size is 1 slice and that there are 90 calories in a serving. If you eat 1 slice, you will have eaten 90 calories. If you eat 2 slices, you will have eaten 180 calories.  How do I keep a food log?  After each time that you eat, record the following in your food log as soon as possible:  What you ate. Be sure to include toppings, sauces, and other extras on the food.  How much you ate. This can be measured in cups, ounces, or number of items.  How many calories were in each food and drink.  The total number of calories in the food you ate.  Keep your food log near you, such as in a pocket-sized notebook or on an katie or website on your mobile phone. Some programs will calculate calories for you and show you how many calories you have left to meet your daily goal.  What are some portion-control tips?  Know how many calories are in a serving. This will help you know how many servings you can have of a certain food.  Use a measuring cup to measure serving sizes. You could also try weighing out portions on a kitchen scale. With time, you will be able to estimate serving sizes for some foods.  Take time to put servings of different foods on your favorite plates or in your favorite bowls and cups so you know what a serving looks like.  Try not to eat straight from a food's packaging, such as from a bag or box. Eating straight from the package makes it hard to see how much you are eating and can lead to overeating. Put the amount you would like to eat in a cup or on a plate to make sure you are eating the right portion.  Use smaller plates, glasses, and bowls for smaller portions and to prevent  overeating.  Try not to multitask. For example, avoid watching TV or using your computer while eating. If it is time to eat, sit down at a table and enjoy your food. This will help you recognize when you are full. It will also help you be more mindful of what and how much you are eating.  What are tips for following this plan?  Reading food labels  Check the calorie count compared with the serving size. The serving size may be smaller than what you are used to eating.  Check the source of the calories. Try to choose foods that are high in protein, fiber, and vitamins, and low in saturated fat, trans fat, and sodium.  Shopping  Read nutrition labels while you shop. This will help you make healthy decisions about which foods to buy.  Pay attention to nutrition labels for low-fat or fat-free foods. These foods sometimes have the same number of calories or more calories than the full-fat versions. They also often have added sugar, starch, or salt to make up for flavor that was removed with the fat.  Make a grocery list of lower-calorie foods and stick to it.  Cooking  Try to cook your favorite foods in a healthier way. For example, try baking instead of frying.  Use low-fat dairy products.  Meal planning  Use more fruits and vegetables. One-half of your plate should be fruits and vegetables.  Include lean proteins, such as chicken, turkey, and fish.  Lifestyle  Each week, aim to do one of the followin minutes of moderate exercise, such as walking.  75 minutes of vigorous exercise, such as running.  General information  Know how many calories are in the foods you eat most often. This will help you calculate calorie counts faster.  Find a way of tracking calories that works for you. Get creative. Try different apps or programs if writing down calories does not work for you.  What foods should I eat?    Eat nutritious foods. It is better to have a nutritious, high-calorie food, such as an avocado, than a food with  few nutrients, such as a bag of potato chips.  Use your calories on foods and drinks that will fill you up and will not leave you hungry soon after eating.  Examples of foods that fill you up are nuts and nut butters, vegetables, lean proteins, and high-fiber foods such as whole grains. High-fiber foods are foods with more than 5 g of fiber per serving.  Pay attention to calories in drinks. Low-calorie drinks include water and unsweetened drinks.  The items listed above may not be a complete list of foods and beverages you can eat. Contact a dietitian for more information.  What foods should I limit?  Limit foods or drinks that are not good sources of vitamins, minerals, or protein or that are high in unhealthy fats. These include:  Candy.  Other sweets.  Sodas, specialty coffee drinks, alcohol, and juice.  The items listed above may not be a complete list of foods and beverages you should avoid. Contact a dietitian for more information.  How do I count calories when eating out?  Pay attention to portions. Often, portions are much larger when eating out. Try these tips to keep portions smaller:  Consider sharing a meal instead of getting your own.  If you get your own meal, eat only half of it. Before you start eating, ask for a container and put half of your meal into it.  When available, consider ordering smaller portions from the menu instead of full portions.  Pay attention to your food and drink choices. Knowing the way food is cooked and what is included with the meal can help you eat fewer calories.  If calories are listed on the menu, choose the lower-calorie options.  Choose dishes that include vegetables, fruits, whole grains, low-fat dairy products, and lean proteins.  Choose items that are boiled, broiled, grilled, or steamed. Avoid items that are buttered, battered, fried, or served with cream sauce. Items labeled as crispy are usually fried, unless stated otherwise.  Choose water, low-fat milk,  unsweetened iced tea, or other drinks without added sugar. If you want an alcoholic beverage, choose a lower-calorie option, such as a glass of wine or light beer.  Ask for dressings, sauces, and syrups on the side. These are usually high in calories, so you should limit the amount you eat.  If you want a salad, choose a garden salad and ask for grilled meats. Avoid extra toppings such as hawkins, cheese, or fried items. Ask for the dressing on the side, or ask for olive oil and vinegar or lemon to use as dressing.  Estimate how many servings of a food you are given. Knowing serving sizes will help you be aware of how much food you are eating at restaurants.  Where to find more information  Centers for Disease Control and Prevention: www.cdc.gov  [place] Department of Agriculture: myplate.gov  Summary  Calorie counting means keeping track of how many calories you eat and drink each day. If you eat fewer calories than your body needs, you should lose weight.  A healthy amount of weight to lose per week is usually 1-2 lb (0.5-0.9 kg). This usually means reducing your daily calorie intake by 500-750 calories.  The number of calories in a food can be found on a Nutrition Facts label. If a food does not have a Nutrition Facts label, try to look up the calories online or ask your dietitian for help.  Use smaller plates, glasses, and bowls for smaller portions and to prevent overeating.  Use your calories on foods and drinks that will fill you up and not leave you hungry shortly after a meal.  This information is not intended to replace advice given to you by your health care provider. Make sure you discuss any questions you have with your health care provider.  Document Revised: 01/28/2021 Document Reviewed: 01/28/2021  [employer] Patient Education © 2023 [employer]  Exercising to Stay Healthy  To become healthy and stay healthy, it is recommended that you do moderate-intensity and vigorous-intensity exercise. You can tell  that you are exercising at a moderate intensity if your heart starts beating faster and you start breathing faster but can still hold a conversation. You can tell that you are exercising at a vigorous intensity if you are breathing much harder and faster and cannot hold a conversation while exercising.  How can exercise benefit me?  Exercising regularly is important. It has many health benefits, such as:  Improving overall fitness, flexibility, and endurance.  Increasing bone density.  Helping with weight control.  Decreasing body fat.  Increasing muscle strength and endurance.  Reducing stress and tension, anxiety, depression, or anger.  Improving overall health.  What guidelines should I follow while exercising?  Before you start a new exercise program, talk with your health care provider.  Do not exercise so much that you hurt yourself, feel dizzy, or get very short of breath.  Wear comfortable clothes and wear shoes with good support.  Drink plenty of water while you exercise to prevent dehydration or heat stroke.  Work out until your breathing and your heartbeat get faster (moderate intensity).  How often should I exercise?  Choose an activity that you enjoy, and set realistic goals. Your health care provider can help you make an activity plan that is individually designed and works best for you.  Exercise regularly as told by your health care provider. This may include:  Doing strength training two times a week, such as:  Lifting weights.  Using resistance bands.  Push-ups.  Sit-ups.  Yoga.  Doing a certain intensity of exercise for a given amount of time. Choose from these options:  A total of 150 minutes of moderate-intensity exercise every week.  A total of 75 minutes of vigorous-intensity exercise every week.  A mix of moderate-intensity and vigorous-intensity exercise every week.  Children, pregnant women, people who have not exercised regularly, people who are overweight, and older adults may need to  talk with a health care provider about what activities are safe to perform. If you have a medical condition, be sure to talk with your health care provider before you start a new exercise program.  What are some exercise ideas?  Moderate-intensity exercise ideas include:  Walking 1 mile (1.6 km) in about 15 minutes.  Biking.  Hiking.  Golfing.  Dancing.  Water aerobics.  Vigorous-intensity exercise ideas include:  Walking 4.5 miles (7.2 km) or more in about 1 hour.  Jogging or running 5 miles (8 km) in about 1 hour.  Biking 10 miles (16.1 km) or more in about 1 hour.  Lap swimming.  Roller-skating or in-line skating.  Cross-country skiing.  Vigorous competitive sports, such as football, basketball, and soccer.  Jumping rope.  Aerobic dancing.  What are some everyday activities that can help me get exercise?  Yard work, such as:  Pushing a .  Raking and bagging leaves.  Washing your car.  Pushing a stroller.  Shoveling snow.  Gardening.  Washing windows or floors.  How can I be more active in my day-to-day activities?  Use stairs instead of an elevator.  Take a walk during your lunch break.  If you drive, park your car farther away from your work or school.  If you take public transportation, get off one stop early and walk the rest of the way.  Stand up or walk around during all of your indoor phone calls.  Get up, stretch, and walk around every 30 minutes throughout the day.  Enjoy exercise with a friend. Support to continue exercising will help you keep a regular routine of activity.  Where to find more information  You can find more information about exercising to stay healthy from:  [place]. Department of Health and Human Services: www.hhs.gov  Centers for Disease Control and Prevention (CDC): www.cdc.gov  Summary  Exercising regularly is important. It will improve your overall fitness, flexibility, and endurance.  Regular exercise will also improve your overall health. It can help you control your weight,  reduce stress, and improve your bone density.  Do not exercise so much that you hurt yourself, feel dizzy, or get very short of breath.  Before you start a new exercise program, talk with your health care provider.  This information is not intended to replace advice given to you by your health care provider. Make sure you discuss any questions you have with your health care provider.  Document Revised: 04/15/2022 Document Reviewed: 04/15/2022  [employer] Patient Education ©  [employer]  Annual flu shot has been recommended to patient. Optimal timing of this vaccination is in mid October of each year.    Covid booster when available at pharmacy.   Medicare Wellness  Personal Prevention Plan of Service     Date of Office Visit:    Encounter Provider:  Jama Moses MD  Place of Service:  [facility]  Patient Name: Jama Birch III  :  1950    As part of the Medicare Wellness portion of your visit today, we are providing you with this personalized preventive plan of services (PPPS). This plan is based upon recommendations of the [place] Preventive Services Task Force (USPSTF) and the Advisory Committee on Immunization Practices (ACIP).    This lists the preventive care services that should be considered, and provides dates of when you are due. Items listed as completed are up-to-date and do not require any further intervention.    Health Maintenance   Topic Date Due    LIPID PANEL  2023    COVID-19 Vaccine (6 - Pfizer series) 03/10/2023    BMI FOLLOWUP  2023    INFLUENZA VACCINE  10/01/2023    ANNUAL WELLNESS VISIT  2024    TDAP/TD VACCINES (2 - Td or Tdap) 2026    COLORECTAL CANCER SCREENING  2027    HEPATITIS C SCREENING  Completed    Pneumococcal Vaccine 65+  Completed    ZOSTER VACCINE  Completed       Orders Placed This Encounter   Procedures    Comprehensive Metabolic Panel     Order Specific Question:   Release to patient      Answer:   Routine Release [1400000002]    Lipid Panel With / Chol / HDL Ratio     Order Specific Question:   Release to patient     Answer:   Routine Release [1400000002]    PSA DIAGNOSTIC     Order Specific Question:   Release to patient     Answer:   Routine Release [1400000002]    CBC & Differential     Order Specific Question:   Manual Differential     Answer:   No     Order Specific Question:   Release to patient     Answer:   Routine Release [1400000002]       No follow-ups on file.

## 2023-10-28 DIAGNOSIS — I25.10 CORONARY ARTERY DISEASE INVOLVING NATIVE CORONARY ARTERY OF NATIVE HEART WITHOUT ANGINA PECTORIS: Chronic | ICD-10-CM

## 2023-10-28 DIAGNOSIS — E78.6 LOW HDL (UNDER 40): ICD-10-CM

## 2023-10-28 DIAGNOSIS — Z95.5 PRESENCE OF DRUG COATED STENT IN LAD CORONARY ARTERY: ICD-10-CM

## 2023-10-30 RX ORDER — CLOPIDOGREL BISULFATE 75 MG/1
TABLET ORAL
Qty: 90 TABLET | Refills: 0 | Status: SHIPPED | OUTPATIENT
Start: 2023-10-30

## 2024-01-28 DIAGNOSIS — I25.10 CORONARY ARTERY DISEASE INVOLVING NATIVE CORONARY ARTERY OF NATIVE HEART WITHOUT ANGINA PECTORIS: Chronic | ICD-10-CM

## 2024-01-28 DIAGNOSIS — Z95.5 PRESENCE OF DRUG COATED STENT IN LAD CORONARY ARTERY: ICD-10-CM

## 2024-01-28 DIAGNOSIS — E78.6 LOW HDL (UNDER 40): ICD-10-CM

## 2024-01-29 RX ORDER — CLOPIDOGREL BISULFATE 75 MG/1
TABLET ORAL
Qty: 90 TABLET | Refills: 0 | Status: SHIPPED | OUTPATIENT
Start: 2024-01-29

## 2024-04-29 DIAGNOSIS — E78.6 LOW HDL (UNDER 40): ICD-10-CM

## 2024-04-29 DIAGNOSIS — Z95.5 PRESENCE OF DRUG COATED STENT IN LAD CORONARY ARTERY: ICD-10-CM

## 2024-04-29 DIAGNOSIS — I25.10 CORONARY ARTERY DISEASE INVOLVING NATIVE CORONARY ARTERY OF NATIVE HEART WITHOUT ANGINA PECTORIS: Chronic | ICD-10-CM

## 2024-04-29 RX ORDER — CLOPIDOGREL BISULFATE 75 MG/1
TABLET ORAL
Qty: 90 TABLET | Refills: 0 | Status: SHIPPED | OUTPATIENT
Start: 2024-04-29

## 2024-04-29 RX ORDER — ATORVASTATIN CALCIUM 40 MG/1
TABLET, FILM COATED ORAL
Qty: 90 TABLET | Refills: 1 | Status: SHIPPED | OUTPATIENT
Start: 2024-04-29

## 2024-05-10 ENCOUNTER — OFFICE VISIT (OUTPATIENT)
Dept: CARDIOLOGY | Facility: CLINIC | Age: 74
End: 2024-05-10
Payer: COMMERCIAL

## 2024-05-10 VITALS
OXYGEN SATURATION: 95 % | BODY MASS INDEX: 27.71 KG/M2 | SYSTOLIC BLOOD PRESSURE: 132 MMHG | DIASTOLIC BLOOD PRESSURE: 70 MMHG | WEIGHT: 182.8 LBS | HEART RATE: 63 BPM | HEIGHT: 68 IN

## 2024-05-10 DIAGNOSIS — Z95.5 PRESENCE OF DRUG COATED STENT IN LAD CORONARY ARTERY: ICD-10-CM

## 2024-05-10 DIAGNOSIS — I25.10 CORONARY ARTERY DISEASE INVOLVING NATIVE CORONARY ARTERY OF NATIVE HEART WITHOUT ANGINA PECTORIS: Primary | ICD-10-CM

## 2024-05-10 DIAGNOSIS — E78.6 LOW HDL (UNDER 40): ICD-10-CM

## 2024-05-10 PROCEDURE — 99214 OFFICE O/P EST MOD 30 MIN: CPT | Performed by: INTERNAL MEDICINE

## 2024-05-10 PROCEDURE — 93000 ELECTROCARDIOGRAM COMPLETE: CPT | Performed by: INTERNAL MEDICINE

## 2024-07-19 RX ORDER — SILDENAFIL CITRATE 20 MG/1
TABLET ORAL
Qty: 50 TABLET | Refills: 11 | Status: CANCELLED | OUTPATIENT
Start: 2024-07-19

## 2024-07-22 ENCOUNTER — HOSPITAL ENCOUNTER (OUTPATIENT)
Dept: GENERAL RADIOLOGY | Facility: HOSPITAL | Age: 74
Discharge: HOME | End: 2024-07-22
Admitting: FAMILY MEDICINE
Payer: COMMERCIAL

## 2024-07-22 ENCOUNTER — OFFICE VISIT (OUTPATIENT)
Dept: FAMILY MEDICINE CLINIC | Facility: CLINIC | Age: 74
End: 2024-07-22
Payer: COMMERCIAL

## 2024-07-22 VITALS
HEIGHT: 68 IN | DIASTOLIC BLOOD PRESSURE: 66 MMHG | SYSTOLIC BLOOD PRESSURE: 126 MMHG | OXYGEN SATURATION: 98 % | BODY MASS INDEX: 27.28 KG/M2 | WEIGHT: 180 LBS | HEART RATE: 68 BPM

## 2024-07-22 DIAGNOSIS — M25.562 ARTHRALGIA OF KNEE, LEFT: Primary | ICD-10-CM

## 2024-07-22 PROCEDURE — G2211 COMPLEX E/M VISIT ADD ON: HCPCS | Performed by: FAMILY MEDICINE

## 2024-07-22 PROCEDURE — 73560 X-RAY EXAM OF KNEE 1 OR 2: CPT

## 2024-07-22 PROCEDURE — 99213 OFFICE O/P EST LOW 20 MIN: CPT | Performed by: FAMILY MEDICINE

## 2024-07-22 PROCEDURE — 1126F AMNT PAIN NOTED NONE PRSNT: CPT | Performed by: FAMILY MEDICINE

## 2024-07-22 NOTE — PROGRESS NOTES
"Chief Complaint  Knee Pain (Left, starting aching about 5-6 weeks ago, has gotten a little better, has been taking hot baths with epsom salt, still walking, just at a slower pace)    Subjective        Knee Pain   Associated symptoms include an inability to bear weight. Pertinent negatives include no numbness or tingling.   Extremity Pain   The pain is present in the left knee. The problem has been unchanged. The quality of the pain is described as aching and burning. The pain is at a severity of 5/10. Associated symptoms include an inability to bear weight and lower extremity swelling. Pertinent negatives include no numbness, tingling or redness. Additional comments: When I have been seated for over an hour, such as at a table, it is painful to rise and walk.  Once I have gotten up, it takes a minute to walk comfortably.        The patient is a 74-year-old male who presents for evaluation of left knee pain.    Approximately 5 to 6 weeks ago, he began experiencing pain in his left knee, particularly on the inside. Despite attempts to manage the pain, he continued walking at a slower pace. The pain intensifies when he rises after sitting for 1 to 2 hours during dinner or games. Upon standing, his knee feels unstable and causes discomfort. However, once he is up and leaning on the table, the pain subsides. He also experiences pain at night. Ibuprofen provides some relief. He has not consulted an orthopedist and does not recall having an x-ray of his knee. The pain appears to be improving. He has used heat for relief but has not applied ice to it.     Review of Systems   Neurological:  Negative for tingling and numbness.        Vital Signs:   Vitals:    07/22/24 1048   BP: 126/66   Pulse: 68   SpO2: 98%   Weight: 81.6 kg (180 lb)   Height: 172.7 cm (68\")            7/22/2024    10:56 AM   PHQ-2/PHQ-9 Depression Screening   Little Interest or Pleasure in Doing Things 0-->not at all   Feeling Down, Depressed or Hopeless " 0-->not at all   PHQ-9: Brief Depression Severity Measure Score 0                 Physical Exam  Constitutional:       General: He is not in acute distress.  Musculoskeletal:      Right knee: No swelling. Normal range of motion.      Left knee: Swelling present. Normal range of motion. Tenderness present over the medial joint line (with some warmth but no redness).   Neurological:      Mental Status: He is alert.          Left knee exhibits slight warmth. No crepitus.             Results                  Assessment and Plan              1. Arthralgia of the left knee.  Tylenol will be administered as needed for discomfort. Heat application should be applied before use and ice for 10 to 20 minutes towards the end of the day. A knee x-ray will be checked.    Follow-up  Follow-up scheduled for 09/16/2024.       Patient was given instructions and counseling regarding his condition or for health maintenance advice. Please see specific information pulled into the AVS if appropriate.     Patient or patient representative verbalized consent for the use of Ambient Listening during the visit with  Jama Moses MD for chart documentation. 7/22/2024  11:43 EDT

## 2024-07-24 NOTE — PROGRESS NOTES
Please give the patient the following message:  Your test results have some minor abnormalities that do not require any change in your treatment at this time.

## 2024-07-25 DIAGNOSIS — Z95.5 PRESENCE OF DRUG COATED STENT IN LAD CORONARY ARTERY: ICD-10-CM

## 2024-07-25 DIAGNOSIS — I25.10 CORONARY ARTERY DISEASE INVOLVING NATIVE CORONARY ARTERY OF NATIVE HEART WITHOUT ANGINA PECTORIS: Chronic | ICD-10-CM

## 2024-07-25 DIAGNOSIS — E78.6 LOW HDL (UNDER 40): ICD-10-CM

## 2024-07-25 RX ORDER — CLOPIDOGREL BISULFATE 75 MG/1
TABLET ORAL
Qty: 90 TABLET | Refills: 0 | Status: SHIPPED | OUTPATIENT
Start: 2024-07-25

## 2024-09-16 ENCOUNTER — OFFICE VISIT (OUTPATIENT)
Dept: FAMILY MEDICINE CLINIC | Facility: CLINIC | Age: 74
End: 2024-09-16
Payer: COMMERCIAL

## 2024-09-16 VITALS
SYSTOLIC BLOOD PRESSURE: 124 MMHG | OXYGEN SATURATION: 98 % | HEART RATE: 72 BPM | WEIGHT: 175 LBS | DIASTOLIC BLOOD PRESSURE: 60 MMHG | BODY MASS INDEX: 26.52 KG/M2 | HEIGHT: 68 IN

## 2024-09-16 DIAGNOSIS — R35.1 NOCTURIA: ICD-10-CM

## 2024-09-16 DIAGNOSIS — Z00.00 MEDICARE ANNUAL WELLNESS VISIT, SUBSEQUENT: Primary | ICD-10-CM

## 2024-09-16 DIAGNOSIS — N52.8 OTHER MALE ERECTILE DYSFUNCTION: ICD-10-CM

## 2024-09-16 DIAGNOSIS — E87.5 SERUM POTASSIUM ELEVATED: ICD-10-CM

## 2024-09-16 DIAGNOSIS — Z00.00 ENCOUNTER FOR WELLNESS EXAMINATION IN ADULT: ICD-10-CM

## 2024-09-16 DIAGNOSIS — I25.10 CORONARY ARTERY DISEASE INVOLVING NATIVE CORONARY ARTERY OF NATIVE HEART WITHOUT ANGINA PECTORIS: Chronic | ICD-10-CM

## 2024-09-16 DIAGNOSIS — E66.3 OVERWEIGHT (BMI 25.0-29.9): ICD-10-CM

## 2024-09-16 PROCEDURE — 1126F AMNT PAIN NOTED NONE PRSNT: CPT | Performed by: FAMILY MEDICINE

## 2024-09-16 PROCEDURE — 99397 PER PM REEVAL EST PAT 65+ YR: CPT | Performed by: FAMILY MEDICINE

## 2024-09-16 PROCEDURE — 1160F RVW MEDS BY RX/DR IN RCRD: CPT | Performed by: FAMILY MEDICINE

## 2024-09-16 PROCEDURE — G0439 PPPS, SUBSEQ VISIT: HCPCS | Performed by: FAMILY MEDICINE

## 2024-09-16 PROCEDURE — 1159F MED LIST DOCD IN RCRD: CPT | Performed by: FAMILY MEDICINE

## 2024-09-16 RX ORDER — SILDENAFIL CITRATE 20 MG/1
TABLET ORAL
Qty: 50 TABLET | Refills: 11 | Status: SHIPPED | OUTPATIENT
Start: 2024-09-16

## 2024-09-17 LAB
ALBUMIN SERPL-MCNC: 4.4 G/DL (ref 3.5–5.2)
ALBUMIN/GLOB SERPL: 1.5 G/DL
ALP SERPL-CCNC: 75 U/L (ref 39–117)
ALT SERPL-CCNC: 24 U/L (ref 1–41)
AST SERPL-CCNC: 28 U/L (ref 1–40)
BASOPHILS # BLD AUTO: 0.03 10*3/MM3 (ref 0–0.2)
BASOPHILS NFR BLD AUTO: 0.7 % (ref 0–1.5)
BILIRUB SERPL-MCNC: 0.2 MG/DL (ref 0–1.2)
BUN SERPL-MCNC: 18 MG/DL (ref 8–23)
BUN/CREAT SERPL: 17.5 (ref 7–25)
CALCIUM SERPL-MCNC: 9.4 MG/DL (ref 8.6–10.5)
CHLORIDE SERPL-SCNC: 108 MMOL/L (ref 98–107)
CHOLEST SERPL-MCNC: 69 MG/DL (ref 0–200)
CHOLEST/HDLC SERPL: 1.97 {RATIO}
CO2 SERPL-SCNC: 26.3 MMOL/L (ref 22–29)
CREAT SERPL-MCNC: 1.03 MG/DL (ref 0.76–1.27)
EGFRCR SERPLBLD CKD-EPI 2021: 76.7 ML/MIN/1.73
EOSINOPHIL # BLD AUTO: 0.09 10*3/MM3 (ref 0–0.4)
EOSINOPHIL NFR BLD AUTO: 2 % (ref 0.3–6.2)
ERYTHROCYTE [DISTWIDTH] IN BLOOD BY AUTOMATED COUNT: 14 % (ref 12.3–15.4)
GLOBULIN SER CALC-MCNC: 3 GM/DL
GLUCOSE SERPL-MCNC: 99 MG/DL (ref 65–99)
HCT VFR BLD AUTO: 42.1 % (ref 37.5–51)
HDLC SERPL-MCNC: 35 MG/DL (ref 40–60)
HGB BLD-MCNC: 13.8 G/DL (ref 13–17.7)
IMM GRANULOCYTES # BLD AUTO: 0 10*3/MM3 (ref 0–0.05)
IMM GRANULOCYTES NFR BLD AUTO: 0 % (ref 0–0.5)
LDLC SERPL CALC-MCNC: 23 MG/DL (ref 0–100)
LYMPHOCYTES # BLD AUTO: 1.05 10*3/MM3 (ref 0.7–3.1)
LYMPHOCYTES NFR BLD AUTO: 23 % (ref 19.6–45.3)
MCH RBC QN AUTO: 29.9 PG (ref 26.6–33)
MCHC RBC AUTO-ENTMCNC: 32.8 G/DL (ref 31.5–35.7)
MCV RBC AUTO: 91.3 FL (ref 79–97)
MONOCYTES # BLD AUTO: 0.58 10*3/MM3 (ref 0.1–0.9)
MONOCYTES NFR BLD AUTO: 12.7 % (ref 5–12)
NEUTROPHILS # BLD AUTO: 2.81 10*3/MM3 (ref 1.7–7)
NEUTROPHILS NFR BLD AUTO: 61.6 % (ref 42.7–76)
NRBC BLD AUTO-RTO: 0 /100 WBC (ref 0–0.2)
PLATELET # BLD AUTO: 244 10*3/MM3 (ref 140–450)
POTASSIUM SERPL-SCNC: 5.8 MMOL/L (ref 3.5–5.2)
PROT SERPL-MCNC: 7.4 G/DL (ref 6–8.5)
PSA SERPL-MCNC: 0.42 NG/ML (ref 0–4)
RBC # BLD AUTO: 4.61 10*6/MM3 (ref 4.14–5.8)
SODIUM SERPL-SCNC: 140 MMOL/L (ref 136–145)
TRIGL SERPL-MCNC: 33 MG/DL (ref 0–150)
VLDLC SERPL CALC-MCNC: 11 MG/DL (ref 5–40)
WBC # BLD AUTO: 4.56 10*3/MM3 (ref 3.4–10.8)

## 2024-09-26 ENCOUNTER — TELEPHONE (OUTPATIENT)
Dept: FAMILY MEDICINE CLINIC | Facility: CLINIC | Age: 74
End: 2024-09-26

## 2024-09-26 NOTE — TELEPHONE ENCOUNTER
Caller: [employer]    Relationship: Other    Best call back number: [phone]     What is the best time to reach you: ANY TIME    What was the call regarding: RACHEL STATES THAT SHE IS FAXING OVER PAPERWORK FOR A PRIOR AUTHORIZATION ON THIS PATIENT FOR AN AUTHORIZATION FOR sildenafil (REVATIO) 20 MG tablet . RACHEL STATES THAT SHE NEED ADDITIONAL INFORMATION AND IF THE PAPERWORK CAN BE FILLED OUT AND RE FAXED BACK [phone].    PLEASE CONTACT WITH ANY QUESTIONS OR CONCERNS OR IF THIS PAPERWORK IS NOT RECEIVED.

## 2024-10-19 DIAGNOSIS — Z95.5 PRESENCE OF DRUG COATED STENT IN LAD CORONARY ARTERY: ICD-10-CM

## 2024-10-19 DIAGNOSIS — I25.10 CORONARY ARTERY DISEASE INVOLVING NATIVE CORONARY ARTERY OF NATIVE HEART WITHOUT ANGINA PECTORIS: Chronic | ICD-10-CM

## 2024-10-19 DIAGNOSIS — E78.6 LOW HDL (UNDER 40): ICD-10-CM

## 2024-10-21 RX ORDER — ATORVASTATIN CALCIUM 40 MG/1
TABLET, FILM COATED ORAL
Qty: 90 TABLET | Refills: 1 | Status: SHIPPED | OUTPATIENT
Start: 2024-10-21

## 2024-10-21 RX ORDER — CLOPIDOGREL BISULFATE 75 MG/1
TABLET ORAL
Qty: 90 TABLET | Refills: 0 | Status: SHIPPED | OUTPATIENT
Start: 2024-10-21

## 2024-11-27 ENCOUNTER — TELEPHONE (OUTPATIENT)
Dept: CARDIOLOGY | Facility: CLINIC | Age: 74
End: 2024-11-27
Payer: COMMERCIAL

## 2024-11-27 NOTE — TELEPHONE ENCOUNTER
Called and informed patient that he does not need any premedication prior to his dental work that he is getting done 12/02/2024.     Patient verbalized understanding and call was ended.

## 2024-11-27 NOTE — TELEPHONE ENCOUNTER
"  Caller: Jama Birch III \"MARCI\"    Relationship: Self    Best call back number: [phone]     What was the call regarding: PT HAVING TWO TEETH EXTRACTED ON 12.02.24 - PT WANTING TO CONFIRM HE DOES NOT NEED ANY PRE MEDICATION BEFORE REMOVAL - PT DOES TAKE PLAVIX AND ATORVASTATIN - PT STATES HE FEELS GREAT   "

## 2025-05-07 DIAGNOSIS — Z95.5 PRESENCE OF DRUG COATED STENT IN LAD CORONARY ARTERY: ICD-10-CM

## 2025-05-07 DIAGNOSIS — I25.10 CORONARY ARTERY DISEASE INVOLVING NATIVE CORONARY ARTERY OF NATIVE HEART WITHOUT ANGINA PECTORIS: Chronic | ICD-10-CM

## 2025-05-07 DIAGNOSIS — E78.6 LOW HDL (UNDER 40): ICD-10-CM

## 2025-05-07 RX ORDER — CLOPIDOGREL BISULFATE 75 MG/1
75 TABLET ORAL DAILY
Qty: 30 TABLET | Refills: 0 | Status: SHIPPED | OUTPATIENT
Start: 2025-05-07

## 2025-05-23 ENCOUNTER — OFFICE VISIT (OUTPATIENT)
Dept: CARDIOLOGY | Age: 75
End: 2025-05-23
Payer: COMMERCIAL

## 2025-05-23 VITALS
HEIGHT: 68 IN | DIASTOLIC BLOOD PRESSURE: 60 MMHG | HEART RATE: 71 BPM | SYSTOLIC BLOOD PRESSURE: 132 MMHG | OXYGEN SATURATION: 97 % | BODY MASS INDEX: 27.65 KG/M2 | WEIGHT: 182.4 LBS

## 2025-05-23 DIAGNOSIS — I25.10 CORONARY ARTERY DISEASE INVOLVING NATIVE CORONARY ARTERY OF NATIVE HEART WITHOUT ANGINA PECTORIS: Primary | Chronic | ICD-10-CM

## 2025-05-23 DIAGNOSIS — Z95.5 PRESENCE OF DRUG COATED STENT IN LAD CORONARY ARTERY: Chronic | ICD-10-CM

## 2025-05-23 DIAGNOSIS — E78.6 LOW HDL (UNDER 40): Chronic | ICD-10-CM

## 2025-05-23 PROCEDURE — 1160F RVW MEDS BY RX/DR IN RCRD: CPT | Performed by: INTERNAL MEDICINE

## 2025-05-23 PROCEDURE — 99214 OFFICE O/P EST MOD 30 MIN: CPT | Performed by: INTERNAL MEDICINE

## 2025-05-23 PROCEDURE — 1159F MED LIST DOCD IN RCRD: CPT | Performed by: INTERNAL MEDICINE

## 2025-05-23 PROCEDURE — 93000 ELECTROCARDIOGRAM COMPLETE: CPT | Performed by: INTERNAL MEDICINE

## 2025-05-23 NOTE — PROGRESS NOTES
Subjective:     Encounter Date: 05/23/25        Patient ID: Jama Birch III is a 74 y.o. male.    Chief Complaint: CAD  History of Present Illness    Dear Dr. Moses,    I had the pleasure of seeing this patient in the office today in follow-up of his CAD.    Doing great without complaints.  Had a single episode of some slight vertigo (has history of this) this morning otherwise no other spells of dizziness or lightheadedness.  No chest pain or chest discomfort.  No shortness of breath.  Denies any sensation of palpitations or tachycardia, no presyncope or syncope.  No other medical illnesses no recent chills/fevers.    Stress test October 2019:  Interpretation Summary     Myocardial perfusion imaging indicates a normal myocardial perfusion study with no evidence of ischemia.  Left ventricular ejection fraction is normal (Calculated EF = 64%).  Impressions are consistent with a low risk study.  There is no prior study available for comparison.         As you know he initially presented with chest discomfort [facility].  Initial troponin was elevated 0.3.  He was admitted to the hospital, treated with oral and parenteral anti-ischemic therapy, and then had a cardiac catheterization that afternoon that demonstrated complete occlusion of the LAD in the mid segment.  This was then treated with a drug-eluting stent.    Patient states that since going home he has had complete resolution of the angina pectoris that he was experiencing beforehand.  He is back to doing about 8000 steps a day and is interested in increasing his activity back to what he was doing before.  He is not having chest pain or chest discomfort.  No shortness of breath with activity.  No tachycardia or palpitations.  Cath 3/2019:  Hemodynamics:   LV:103/4  AO: 103/62        PCI CORONARY SEGMENT: Mid LAD  PRE-STENOSIS:100  POST-STENOSIS: 0%  LESION TYPE: c  SHILOH FLOW PRE/POST: 0/3  CULPRIT LESION: Yes      Estimated blood loss: Minimal  Complications: None     Conclusions:   1. Left main: Normal  2. LAD: Acutely occluded in the midsegment  3. LCX: Dominant vessel.  Normal.  4. RCA: Small caliber nondominant vessel.  Normal.  5.  Normal left ventricular size.  Mildly reduced systolic function.  Mild anterolateral hypokinesis.  Severe mid to distal inferior wall hypokinesis  7.  PCI of the proximal mid LAD with a 3.5 x 28 mm Xience CRI drug-eluting stent     Recommendations dual antiplatelet therapy for 1 year       The following portions of the patient's history were reviewed and updated as appropriate: allergies, current medications, past family history, past medical history, past social history, past surgical history and problem list.    Past Medical History:   Diagnosis Date    Abnormal CT scan; splenic flexure thickening 06/16/2016    Atherosclerosis of both carotid arteries     Cancer 9/8/2019    Cataract 2/10/2023    both eyes    Coronary artery disease involving native coronary artery of native heart without angina pectoris 04/04/2019    Erectile dysfunction 12/21    Becoming more severe    History of non-ST elevation myocardial infarction (NSTEMI) 02/17/2020    Myocardial infarction 3/26/2019       Past Surgical History:   Procedure Laterality Date    CARDIAC CATHETERIZATION N/A 03/26/2019    Procedure: Left Heart Cath;  Surgeon: Sachin Barrera MD;  [facility];  Service: Cardiology    CARDIAC CATHETERIZATION N/A 03/26/2019    Procedure: Left ventriculography;  Surgeon: Sachin Barrera MD;  Location: [facility];  Service: Cardiology    CARDIAC CATHETERIZATION N/A 03/26/2019    Procedure: Coronary angiography;  Surgeon: Sachin Barrera MD;  Location: [facility];  Service: Cardiology    CARDIAC CATHETERIZATION N/A 03/26/2019    Procedure: Stent PRASANNA coronary;  Surgeon: Sachin Barrera MD;  Location: [facility] CATH  "INVASIVE LOCATION;  Service: Cardiology    CORONARY ANGIOPLASTY WITH STENT PLACEMENT  03/26/2019    LAD stent    EYE SURGERY  2/26/2023    Cataract surgery    SKIN CANCER EXCISION  2019    scalp             ECG 12 Lead    Date/Time: 5/23/2025 2:43 PM  Performed by: Vicente Schmitz III, MD    Authorized by: Vicente Schmitz III, MD  Comparison: compared with previous ECG   Similar to previous ECG  Rhythm: sinus rhythm  Rate: normal  Conduction: conduction normal  ST Segments: ST segments normal  T Waves: T waves normal  QRS axis: normal  Other: no other findings    Clinical impression: normal ECG             Objective:     Vitals:    05/23/25 1423   BP: 132/60   BP Location: Right arm   Patient Position: Sitting   Cuff Size: Adult   Pulse: 71   SpO2: 97%   Weight: 82.7 kg (182 lb 6.4 oz)   Height: 172.7 cm (68\")         Physical Exam  Constitutional:       General: He is not in acute distress.     Appearance: He is well-developed. He is not diaphoretic.   HENT:      Head: Normocephalic and atraumatic.      Nose: Nose normal.   Eyes:      General:         Right eye: No discharge.         Left eye: No discharge.      Conjunctiva/sclera: Conjunctivae normal.      Pupils: Pupils are equal, round, and reactive to light.   Neck:      Thyroid: No thyromegaly.      Trachea: No tracheal deviation.   Cardiovascular:      Rate and Rhythm: Normal rate and regular rhythm.      Pulses: Normal pulses.      Heart sounds: Normal heart sounds, S1 normal and S2 normal.      No S3 sounds.   Pulmonary:      Effort: Pulmonary effort is normal. No respiratory distress.      Breath sounds: Normal breath sounds. No stridor.   Chest:      Chest wall: No tenderness.   Abdominal:      General: Bowel sounds are normal. There is no distension.      Palpations: Abdomen is soft. There is no mass.      Tenderness: There is no abdominal tenderness. There is no guarding or rebound.   Musculoskeletal:         General: No tenderness or deformity. Normal " range of motion.      Cervical back: Normal range of motion and neck supple.   Lymphadenopathy:      Cervical: No cervical adenopathy.   Skin:     General: Skin is warm and dry.      Findings: No erythema or rash.   Neurological:      Mental Status: He is alert and oriented to person, place, and time.      Deep Tendon Reflexes: Reflexes are normal and symmetric.   Psychiatric:         Thought Content: Thought content normal.         Lab Review:           Lab Results   Component Value Date    GLUCOSE 99 09/16/2024    BUN 18 09/16/2024    CREATININE 1.03 09/16/2024    EGFRIFNONA 85 02/22/2022    EGFRIFAFRI 98 06/24/2019    BCR 17.5 09/16/2024    K 5.8 (H) 09/16/2024    CO2 26.3 09/16/2024    CALCIUM 9.4 09/16/2024    ALBUMIN 4.4 09/16/2024    AST 28 09/16/2024    ALT 24 09/16/2024     Lab Results   Component Value Date    CHOL 88 02/22/2022    CHOL 81 07/22/2020    CHOL 122 03/27/2019     Lab Results   Component Value Date    TRIG 33 09/16/2024    TRIG 29 09/11/2023    TRIG 52 02/22/2022     Lab Results   Component Value Date    HDL 35 (L) 09/16/2024    HDL 34 (L) 09/11/2023    HDL 44 02/22/2022     Lab Results   Component Value Date    LDL 23 09/16/2024    LDL 29 09/11/2023    LDL 31 02/22/2022     Lab Results   Component Value Date    VLDL 11 09/16/2024    VLDL 10 09/11/2023    VLDL 13 02/22/2022     Lab Results   Component Value Date    LDLHDL 0.76 02/22/2022    LDLHDL 0.78 07/22/2020    LDLHDL 2.34 03/27/2019       Results for orders placed during the hospital encounter of 11/11/19    Adult Transthoracic Echo Complete W/ Cont if Necessary Per Protocol    Interpretation Summary  · Left ventricular systolic function is normal. Calculated EF = 61%. Estimated EF was in agreement with the calculated EF. Estimated EF = 61%. Normal left ventricular cavity size and wall thickness noted. All left ventricular wall segments contract normally. Left ventricular diastolic function is normal.  · Left atrial volume is mildly  increased. Saline test results are negative.  · There is moderate thickening of the noncoronary cusp of the aortic valve.  · Moderate MAC is present. Mild mitral valve regurgitation is present.  · Physiologic tricuspid valve regurgitation is present. Insufficient TR velocity profile to estimate the right ventricular systolic pressure.            Assessment:          Diagnosis Plan   1. Coronary artery disease involving native coronary artery of native heart without angina pectoris        2. Low HDL (under 40)        3. Presence of drug coated stent in LAD coronary artery                   Plan:       1. Coronary artery Disease: Status post stent placement to the LAD in 2019.  Doing great with no symptoms. His anginal symptoms at that time were left arm pain, shortness of breath, chest pain, and headache.   2.  Mixed hyperlipidemia, continue atorvastatin, LDL, HDL, AST and ALT are reviewed.  3.  Carotid artery plaque, noted on vascular screening in 2019, continue risk factor modification.    Thank you very much for allowing us to participate in the care of this pleasant patient.  Please don't hesitate to call if I can be of assistance in any way.      Current Outpatient Medications:     atorvastatin (LIPITOR) 40 MG tablet, TAKE 1 TABLET BY MOUTH EVERY DAY EVERY NIGHT, Disp: 90 tablet, Rfl: 1    clopidogrel (PLAVIX) 75 MG tablet, Take 1 tablet by mouth Daily., Disp: 30 tablet, Rfl: 0    loratadine (CLARITIN) 10 MG tablet, Take 1 tablet by mouth Daily., Disp: , Rfl:     sildenafil (REVATIO) 20 MG tablet, Take 3-5 tablets  po 60 minutes prior to sexual activity with a light meal as needed, Disp: 50 tablet, Rfl: 11

## 2025-06-03 DIAGNOSIS — Z95.5 PRESENCE OF DRUG COATED STENT IN LAD CORONARY ARTERY: ICD-10-CM

## 2025-06-03 DIAGNOSIS — I25.10 CORONARY ARTERY DISEASE INVOLVING NATIVE CORONARY ARTERY OF NATIVE HEART WITHOUT ANGINA PECTORIS: Chronic | ICD-10-CM

## 2025-06-03 DIAGNOSIS — E78.6 LOW HDL (UNDER 40): ICD-10-CM

## 2025-06-04 RX ORDER — CLOPIDOGREL BISULFATE 75 MG/1
75 TABLET ORAL DAILY
Qty: 90 TABLET | Refills: 3 | Status: SHIPPED | OUTPATIENT
Start: 2025-06-04

## 2025-07-14 RX ORDER — ATORVASTATIN CALCIUM 40 MG/1
TABLET, FILM COATED ORAL
Qty: 90 TABLET | Refills: 2 | Status: SHIPPED | OUTPATIENT
Start: 2025-07-14

## (undated) DEVICE — CATH DIAG IMPULSE PIG 5F 100CM

## (undated) DEVICE — DEV INDEFLATOR

## (undated) DEVICE — GW EMR FIX EXCHG J STD .035 3MM 260CM

## (undated) DEVICE — CATH DIAG IMPULSE FL3.5 5F 100CM

## (undated) DEVICE — GLIDESHEATH SLENDER STAINLESS STEEL KIT: Brand: GLIDESHEATH SLENDER

## (undated) DEVICE — CATH DIAG IMPULSE FR5 5F 100CM

## (undated) DEVICE — KT MANIFLD CARDIAC

## (undated) DEVICE — RUNTHROUGH NS EXTRA FLOPPY PTCA GUIDEWIRE: Brand: RUNTHROUGH

## (undated) DEVICE — TREK CORONARY DILATATION CATHETER 3.50 MM X 15 MM / RAPID-EXCHANGE: Brand: TREK

## (undated) DEVICE — PK CATH CARD 40

## (undated) DEVICE — 6F .070 XB 3.5 100CM: Brand: VISTA BRITE TIP